# Patient Record
Sex: MALE | Race: WHITE
[De-identification: names, ages, dates, MRNs, and addresses within clinical notes are randomized per-mention and may not be internally consistent; named-entity substitution may affect disease eponyms.]

---

## 2017-12-07 ENCOUNTER — APPOINTMENT (OUTPATIENT)
Dept: UROLOGY | Facility: CLINIC | Age: 62
End: 2017-12-07
Payer: COMMERCIAL

## 2017-12-07 PROCEDURE — 99213 OFFICE O/P EST LOW 20 MIN: CPT

## 2017-12-18 LAB
ANION GAP SERPL CALC-SCNC: 12 MMOL/L
APPEARANCE: CLEAR
BACTERIA: NEGATIVE
BILIRUBIN URINE: NEGATIVE
BLOOD URINE: NEGATIVE
BUN SERPL-MCNC: 17 MG/DL
CALCIUM SERPL-MCNC: 9.5 MG/DL
CHLORIDE SERPL-SCNC: 101 MMOL/L
CO2 SERPL-SCNC: 32 MMOL/L
COLOR: YELLOW
CORE LAB FLUID CYTOLOGY: NORMAL
CREAT SERPL-MCNC: 0.92 MG/DL
GLUCOSE QUALITATIVE U: NEGATIVE MG/DL
GLUCOSE SERPL-MCNC: 94 MG/DL
KETONES URINE: NEGATIVE
LEUKOCYTE ESTERASE URINE: NEGATIVE
MICROSCOPIC-UA: NORMAL
NITRITE URINE: NEGATIVE
PH URINE: 7
POTASSIUM SERPL-SCNC: 5.2 MMOL/L
PROTEIN URINE: NEGATIVE MG/DL
PSA FREE FLD-MCNC: 8.8
PSA FREE SERPL-MCNC: 0.35 NG/ML
PSA SERPL-MCNC: 3.99 NG/ML
RED BLOOD CELLS URINE: 1 /HPF
SODIUM SERPL-SCNC: 145 MMOL/L
SPECIFIC GRAVITY URINE: 1.02
SQUAMOUS EPITHELIAL CELLS: 0 /HPF
UROBILINOGEN URINE: 1 MG/DL
WHITE BLOOD CELLS URINE: 0 /HPF

## 2018-12-13 ENCOUNTER — APPOINTMENT (OUTPATIENT)
Dept: UROLOGY | Facility: CLINIC | Age: 63
End: 2018-12-13
Payer: COMMERCIAL

## 2018-12-13 PROCEDURE — 99214 OFFICE O/P EST MOD 30 MIN: CPT

## 2018-12-17 LAB
ANION GAP SERPL CALC-SCNC: 12 MMOL/L
APPEARANCE: CLEAR
BACTERIA: NEGATIVE
BILIRUBIN URINE: NEGATIVE
BLOOD URINE: NEGATIVE
BUN SERPL-MCNC: 17 MG/DL
CALCIUM SERPL-MCNC: 9.3 MG/DL
CHLORIDE SERPL-SCNC: 106 MMOL/L
CO2 SERPL-SCNC: 28 MMOL/L
COLOR: YELLOW
CREAT SERPL-MCNC: 0.95 MG/DL
GLUCOSE QUALITATIVE U: NEGATIVE MG/DL
GLUCOSE SERPL-MCNC: 81 MG/DL
HYALINE CASTS: 1 /LPF
KETONES URINE: NEGATIVE
LEUKOCYTE ESTERASE URINE: NEGATIVE
MICROSCOPIC-UA: NORMAL
NITRITE URINE: NEGATIVE
PH URINE: 6.5
POTASSIUM SERPL-SCNC: 4.6 MMOL/L
PROTEIN URINE: NEGATIVE MG/DL
PSA FREE FLD-MCNC: 8 %
PSA FREE SERPL-MCNC: 0.35 NG/ML
PSA SERPL-MCNC: 4.36 NG/ML
RED BLOOD CELLS URINE: 4 /HPF
SODIUM SERPL-SCNC: 146 MMOL/L
SPECIFIC GRAVITY URINE: 1.02
SQUAMOUS EPITHELIAL CELLS: 0 /HPF
UROBILINOGEN URINE: 1 MG/DL
WHITE BLOOD CELLS URINE: 1 /HPF

## 2018-12-20 LAB — CORE LAB FLUID CYTOLOGY: NORMAL

## 2019-03-15 LAB
PSA FREE FLD-MCNC: 10 %
PSA FREE SERPL-MCNC: 0.41 NG/ML
PSA SERPL-MCNC: 4.07 NG/ML

## 2019-06-23 ENCOUNTER — TRANSCRIPTION ENCOUNTER (OUTPATIENT)
Age: 64
End: 2019-06-23

## 2019-09-19 DIAGNOSIS — Z00.00 ENCOUNTER FOR GENERAL ADULT MEDICAL EXAMINATION W/OUT ABNORMAL FINDINGS: ICD-10-CM

## 2019-09-19 DIAGNOSIS — M79.606 PAIN IN LEG, UNSPECIFIED: ICD-10-CM

## 2019-09-19 DIAGNOSIS — H26.9 UNSPECIFIED CATARACT: ICD-10-CM

## 2019-09-24 LAB
PSA FREE FLD-MCNC: 9 %
PSA FREE SERPL-MCNC: 0.42 NG/ML
PSA SERPL-MCNC: 4.72 NG/ML

## 2019-10-23 LAB
ANION GAP SERPL CALC-SCNC: 13 MMOL/L
BUN SERPL-MCNC: 17 MG/DL
CALCIUM SERPL-MCNC: 9.4 MG/DL
CHLORIDE SERPL-SCNC: 104 MMOL/L
CO2 SERPL-SCNC: 28 MMOL/L
CREAT SERPL-MCNC: 1.08 MG/DL
GLUCOSE SERPL-MCNC: 94 MG/DL
POTASSIUM SERPL-SCNC: 4.5 MMOL/L
SODIUM SERPL-SCNC: 145 MMOL/L

## 2019-10-31 ENCOUNTER — FORM ENCOUNTER (OUTPATIENT)
Age: 64
End: 2019-10-31

## 2019-11-01 ENCOUNTER — APPOINTMENT (OUTPATIENT)
Dept: MRI IMAGING | Facility: IMAGING CENTER | Age: 64
End: 2019-11-01
Payer: COMMERCIAL

## 2019-11-01 ENCOUNTER — OUTPATIENT (OUTPATIENT)
Dept: OUTPATIENT SERVICES | Facility: HOSPITAL | Age: 64
LOS: 1 days | End: 2019-11-01
Payer: COMMERCIAL

## 2019-11-01 DIAGNOSIS — Z00.00 ENCOUNTER FOR GENERAL ADULT MEDICAL EXAMINATION WITHOUT ABNORMAL FINDINGS: ICD-10-CM

## 2019-11-01 DIAGNOSIS — M79.606 PAIN IN LEG, UNSPECIFIED: ICD-10-CM

## 2019-11-01 DIAGNOSIS — H26.9 UNSPECIFIED CATARACT: ICD-10-CM

## 2019-11-01 DIAGNOSIS — R97.20 ELEVATED PROSTATE SPECIFIC ANTIGEN [PSA]: ICD-10-CM

## 2019-11-01 DIAGNOSIS — R31.29 OTHER MICROSCOPIC HEMATURIA: ICD-10-CM

## 2019-11-01 PROCEDURE — 72197 MRI PELVIS W/O & W/DYE: CPT | Mod: 26

## 2019-11-01 PROCEDURE — A9585: CPT

## 2019-11-01 PROCEDURE — 72197 MRI PELVIS W/O & W/DYE: CPT

## 2019-11-05 ENCOUNTER — RECORD ABSTRACTING (OUTPATIENT)
Age: 64
End: 2019-11-05

## 2020-01-07 ENCOUNTER — RECORD ABSTRACTING (OUTPATIENT)
Age: 65
End: 2020-01-07

## 2020-02-03 ENCOUNTER — APPOINTMENT (OUTPATIENT)
Dept: UROLOGY | Facility: CLINIC | Age: 65
End: 2020-02-03
Payer: COMMERCIAL

## 2020-02-03 PROCEDURE — 99214 OFFICE O/P EST MOD 30 MIN: CPT

## 2020-02-03 NOTE — ADDENDUM
[FreeTextEntry1] : Entered by Ru Almeida, acting as scribe for Dr. Thong Iqbal.\par \par The documentation recorded by the scribe accurately reflects the service I personally performed and the decisions made by me.

## 2020-02-03 NOTE — LETTER BODY
[FreeTextEntry1] : Stevan Love MD\par [address]\[place] [address]\par P [phone]\par F [phone]\par  \par Dear Dr. Love,\par \par Reason for visit: Abnormal urinalysis. Previous elevated PSA.\par \par This is a 64 -year-old  with transverse myelitis, presenting with previous abnormal urinalysis and previous elevated PSA. The patient returns today for follow up, accompanied by his wife. Patient's current PSA is 4.72. Since the patient's last visit, patient underwent positive prostate biopsy at [facility] demonstrating Andre 7 prostate cancer. He inquires his treatment options including radiation therapy and surgical intervention. He shows concern for the risk of erectile dysfunction and urinary incontinence. He has cervical stenosis and wears a neck brace today. Patient denies any hematuria or dysuria. The past medical history and family history and social history are unchanged. All other review of systems are negative. Patient denies any changes in medications. Medication list was reconciled. His transverse myelitis as well as has been stable.\par \par On examination, the patient is a healthy-appearing gentleman in no acute distress. He is alert and oriented follows commands. He has normal mood and affect. He is normocephalic. Oral no thrush. Neck is supple. Respirations are unlabored. His abdomen is soft and nontender. Liver is nonpalpable. Bladder is nonpalpable. No CVA tenderness. Neurologically he is grossly intact. No peripheral edema. Skin without gross abnormality. He has normal male external genitalia. Normal meatus. Bilateral testes are descended intrascrotally and normal to palpation. On rectal examination, there is normal sphincter tone. The prostate is clinically benign without focal induration or nodularity.\par \par His BMP demonstrated normal renal functions, creatinine 1.08. His PSA was 4.72, which is elevated. \par \par Patient's prostate biopsy indicated Andre 7 prostate cancer.\par \par Assessment: Previous abnormal urinalysis. Previous elevated PSA. Prostate cancer.\par \par .I counseled the patient on its clinical significance of his prostate biopsy. I discussed the patient's treatment options of radiation therapy and surgical intervention in length. I counseled the patient on the risks of surgical intervention including erectile dysfunction as well as the risks of radiation therapy including urinary incontinence. The risks and benefits of each options were discussed in detail as well quality of life issues. I discussed that combination of radiation and hormone therapy is only typical of Andre 8 prostate cancer as the efficacy of radiation treatment is masked by hormone therapy. Given his intermediate risk, I advised that HDR is not recommended. I recommend he consider his risk tolerance to make his decision. I answered the patient's questions. I recommend he consult his surgeon and radiation oncologist. Patient may also obtain a second opinion from my colleagues, Dr. Owusu or Dr. Mavis Perry. Risks and alternatives were discussed. I answered the patient's questions. The patient will follow up as directed. Thank you for the opportunity to participate in the care of this patient. I'll keep you updated on his progress. \par \par Plan: Consult surgeon and radiation oncologist. Obtain second opinion. Follow up in one year.

## 2020-02-10 ENCOUNTER — OUTPATIENT (OUTPATIENT)
Dept: OUTPATIENT SERVICES | Facility: HOSPITAL | Age: 65
LOS: 1 days | Discharge: HOME | End: 2020-02-10
Payer: COMMERCIAL

## 2020-02-10 ENCOUNTER — APPOINTMENT (OUTPATIENT)
Dept: RADIATION ONCOLOGY | Facility: CLINIC | Age: 65
End: 2020-02-10
Payer: COMMERCIAL

## 2020-02-10 VITALS
DIASTOLIC BLOOD PRESSURE: 86 MMHG | HEIGHT: 73 IN | RESPIRATION RATE: 17 BRPM | SYSTOLIC BLOOD PRESSURE: 136 MMHG | OXYGEN SATURATION: 98 % | TEMPERATURE: 97.6 F | WEIGHT: 198.85 LBS | BODY MASS INDEX: 26.36 KG/M2 | HEART RATE: 80 BPM

## 2020-02-10 PROCEDURE — 99204 OFFICE O/P NEW MOD 45 MIN: CPT

## 2020-02-10 NOTE — DISEASE MANAGEMENT
[1] : T1 [0-10] : 0 -10 ng/mL [c] : c [7(3+4)] : Andre Score 7(3+4) [4] : 4 [] : Patient had a bone scan [IIB] : IIB [BiopsyDate] : 12/3/19 [TotalCores] : 13 [MaxCoreInvolvement] : 30% [TotalPositiveCores] : 3 [BoneScanResults] : 2/4/20

## 2020-02-10 NOTE — REVIEW OF SYSTEMS
[Nocturia] : nocturia [Urinary Frequency] : urinary frequency [IPSS Score (0-40): ___] : IPSS score: [unfilled] [EPIC-CP Score (0-60): ___] : EPIC-CP score: [unfilled] [Hematuria: Grade 0] : Hematuria: Grade 0 [Urinary Incontinence: Grade 0] : Urinary Incontinence: Grade 0  [Urinary Retention: Grade 0] : Urinary Retention: Grade 0 [Urinary Tract Pain: Grade 0] : Urinary Tract Pain: Grade 0 [Urinary Urgency: Grade 1 - Present] : Urinary Urgency: Grade 1 - Present [Urinary Frequency: Grade 1 - Present] : Urinary Frequency: Grade 1 - Present [Ejaculation Disorder: Grade 0] : Ejaculation Disorder: Grade 0 [Erectile Dysfunction: Grade 0] : Erectile Dysfunction: Grade 0

## 2020-02-10 NOTE — HISTORY OF PRESENT ILLNESS
[FreeTextEntry1] : Mr. King is a 76 year old male who presents with adenocarcinoma of the prostate [place] score  He was referred by Dr. Iqbal who patient consulted for elevated PSA of 4.72 in Sept, 2019\par \par PSA \par 19 Sep, 2019  -4.72\par 14 MaR, 2019 - 4.07\par 13 Dec, 2018  -4.36 \par 07 Dec, 2017 -3.99\par 15 Dec, 2016 -3.38\par 17 Dec, 2015 -3.54\par 18 Dec, 2014 -2.81\par 19 Dec, 2013 -3.17\par 20 Dec, 2012 -3.08\par 11 June, 2012- 7.9 \par  \par He had MRI prostate done on 11/01/2019 which showed prostate volume of 64 cc. Suspicion for malignancy right posterior apex peripheral zone, if biopsy confirms malignancy, there is suspicion for extracapsular extension PIRADS 4. Also noted was mild thickening of right anterior urinary bladder wall on a background of diffuse urothelial hyperenhancement, possibly inflammatory, possibly neoplastic.\par \par Patient underwent prostate biopsy with Dr. Alexia Muñoz on 12/03/2019,  pathology report demonstrated adenocarcinoma of the prostate with [place] score 3 + 4 =7 involving 5 % - 30% of the tissue. Perineural invasion was identified on right apex medial. \par \par Bone scan done on 2/4/20 showed punctate focus of increased uptake in right sided rib, strongly suspected to be artifactual. CT chest recommended. Findings at the cervical spine consistent with recent cervical laminectomy.\par \par He follow up with Dr Iqbal to discuss his treatment options and was referred to us for second opinion.\par \par Patient presents here today to discuss his radiation treatment option. He report nocturia three times per night, urgency and frequency. Denies dysuria, hematuria and hesitancy, and weak urinary flow. He has regular daily bowel function and he is sexually active. He denies bone or groin pain.\par \par \par \par

## 2020-02-20 ENCOUNTER — RESULT REVIEW (OUTPATIENT)
Age: 65
End: 2020-02-20

## 2020-02-20 PROCEDURE — 88321 CONSLTJ&REPRT SLD PREP ELSWR: CPT

## 2020-03-02 LAB — SURGICAL PATHOLOGY STUDY: SIGNIFICANT CHANGE UP

## 2020-06-11 ENCOUNTER — APPOINTMENT (OUTPATIENT)
Dept: RADIATION ONCOLOGY | Facility: CLINIC | Age: 65
End: 2020-06-11
Payer: COMMERCIAL

## 2020-06-11 ENCOUNTER — APPOINTMENT (OUTPATIENT)
Dept: UROLOGY | Facility: CLINIC | Age: 65
End: 2020-06-11

## 2020-06-11 PROCEDURE — 99213 OFFICE O/P EST LOW 20 MIN: CPT | Mod: 95

## 2020-06-11 NOTE — REVIEW OF SYSTEMS
[IPSS Score (0-40): ___] : IPSS score: [unfilled] [Hematuria: Grade 0] : Hematuria: Grade 0 [EPIC-CP Score (0-60): ___] : EPIC-CP score: [unfilled] [Urinary Incontinence: Grade 0] : Urinary Incontinence: Grade 0  [Urinary Retention: Grade 0] : Urinary Retention: Grade 0 [Urinary Urgency: Grade 1 - Present] : Urinary Urgency: Grade 1 - Present [Urinary Tract Pain: Grade 0] : Urinary Tract Pain: Grade 0 [Erectile Dysfunction: Grade 0] : Erectile Dysfunction: Grade 0 [Ejaculation Disorder: Grade 0] : Ejaculation Disorder: Grade 0 [Nocturia] : nocturia [Negative] : Gastrointestinal [Urinary Frequency: Grade 0] : Urinary Frequency: Grade 0 [Urinary Frequency] : no urinary frequency

## 2020-06-11 NOTE — HISTORY OF PRESENT ILLNESS
[Medical Office: ([facility])___] : at the medical office located in  [Home] : at home, [unfilled] , at the time of the visit. [Verbal consent obtained from patient] : the patient, [unfilled] [FreeTextEntry1] : \par Mr. King is a 76 year old male who presents with adenocarcinoma of the prostate [place] score  He was referred by Dr. Iqbal who patient consulted for elevated PSA of 4.72 in Sept, 2019\par \par PSA \par 5/28/20          - 3.99\par 19 Sep, 2019  -4.72\par 14 MaR, 2019 - 4.07\par 13 Dec, 2018  -4.36 \par 07 Dec, 2017 -3.99\par 15 Dec, 2016 -3.38\par 17 Dec, 2015 -3.54\par 18 Dec, 2014 -2.81\par 19 Dec, 2013 -3.17\par 20 Dec, 2012 -3.08\par 11 June, 2012- 7.9 \par  \par He had MRI prostate done on 11/01/2019 which showed prostate volume of 64 cc. Suspicion for malignancy right posterior apex peripheral zone, if biopsy confirms malignancy, there is suspicion for extracapsular extension PIRADS 4. Also noted was mild thickening of right anterior urinary bladder wall on a background of diffuse urothelial hyperenhancement, possibly inflammatory, possibly neoplastic.\par \par Patient underwent prostate biopsy with Dr. Alexia Muñoz on 12/03/2019,  pathology report demonstrated adenocarcinoma of the prostate with Andre score 3 + 4 =7 involving 5 % - 30% of the tissue. Perineural invasion was identified on right apex medial. \par \par Bone scan done on 2/4/20 showed punctate focus of increased uptake in right sided rib, strongly suspected to be artifactual. CT chest recommended. Findings at the cervical spine consistent with recent cervical laminectomy.\par \par He follow up with Dr Iqbal to discuss his treatment options and was referred to us for second opinion.\par \par Patient presents here today for active surveillance. He report 1-2 times nocturia per night and occasional urgency. He denies any other urinary or bowel issues and he is sexually active.\par \par \par \par

## 2020-06-11 NOTE — DISEASE MANAGEMENT
[1] : T1 [c] : c [0-10] : 0 -10 ng/mL [7(3+4)] : Andre Score 7(3+4) [] : Patient had a bone scan [4] : 4 [IIB] : IIB [BiopsyDate] : 12/3/19 [TotalCores] : 13 [TotalPositiveCores] : 3 [MaxCoreInvolvement] : 30% [BoneScanResults] : 2/4/20

## 2020-06-18 ENCOUNTER — APPOINTMENT (OUTPATIENT)
Dept: UROLOGY | Facility: CLINIC | Age: 65
End: 2020-06-18
Payer: COMMERCIAL

## 2020-06-18 PROCEDURE — 99214 OFFICE O/P EST MOD 30 MIN: CPT | Mod: 95

## 2020-06-18 NOTE — HISTORY OF PRESENT ILLNESS
[Home] : at home, [unfilled] , at the time of the visit. [Medical Office: ([facility])___] : at the medical office located in  [Verbal consent obtained from patient] : the patient, [unfilled] [FreeTextEntry1] : Confirmed PT PHONE#:  [phone] \par \par The patient-doctor relationship has been established in a face to face fashion via real time video/audio HIPAA compliant communication using telemedicine software. The patient's identity has been confirmed.  The patient was previously emailed a copy of the telemedicine consent.  They have had a chance to review and has now given verbal consent and has requested care to be assessed and treated through telemedicine and understands there maybe limitations in this process and they may need further follow up care in the office and or hospital settings.   \par \par Please refer to URO Consult note

## 2020-06-18 NOTE — LETTER BODY
[FreeTextEntry1] : Stevan Love MD\par [address]\[place] [address]\par P [phone]\par F [phone]\par  \par Dear Dr. Love,\par \par Reason for visit: Abnormal urinalysis. Prostate cancer.\par \par This is a 64 -year-old  with transverse myelitis, presenting with previous abnormal urinalysis and prostate cancer. Patient underwent positive prostate biopsy, positive for 3 of 13 cores, at [facility] demonstrating [place] 7 prostate cancer. Patient requested telephone visit. Verbal consent was obtained. Patient was at home and consulted over the phone through a TeleHealth visit. I was in the office in [place]. Patient's current PSA is 3.99. Since the patient's last visit, patient reports consulting Dr. Owusu for a second opinion on the treatment of his prostate cancer, who recommends Cyberknife therapy. He inquires about the risks and benefits of radiation therapy versus surgery. He recently underwent cervical laminectomy. Patient denies any hematuria or dysuria. The past medical history and family history and social history are unchanged. All other review of systems are negative. Patient denies any changes in medications. Medication list was reconciled. His transverse myelitis as well as has been stable.\par \par Given that this was a Telehealth visit, I was unable to physically examine the patient, his physical examination was deferred. \par \par His PSA is now 3.99, which has decreased. His previous PSA was 4.72.\par \par Assessment: Previous abnormal urinalysis. Prostate cancer.\par \par I counseled the patient. I discussed the patient's treatment options of radiation therapy and surgical intervention in length. I counseled the patient regarding the risks involved with surgical intervention including impotence and incontinence although it results in better prognosis. I discussed that surgery has the option of salvage radiation therapy if there is recurrent disease. The risks and benefits of each options were discussed in detail as well quality of life issues. Given his Andre 7 prostate cancer, I discussed that Cyberknife and role of XRT. I recommend he consider his risk tolerance to make his decision. He will continue to consider his options. I answered the patient's questions. Risks and alternatives were discussed.  The patient will follow up as directed. Thank you for the opportunity to participate in the care of this patient. I'll keep you updated on his progress. \par \par Plan: Consider options. Follow up as directed.

## 2020-08-04 PROCEDURE — 77263 THER RADIOLOGY TX PLNG CPLX: CPT

## 2020-08-25 ENCOUNTER — OUTPATIENT (OUTPATIENT)
Dept: OUTPATIENT SERVICES | Facility: HOSPITAL | Age: 65
LOS: 1 days | Discharge: HOME | End: 2020-08-25
Payer: COMMERCIAL

## 2020-08-25 PROCEDURE — 55876 PLACE RT DEVICE/MARKER PROS: CPT

## 2020-08-25 PROCEDURE — 76872 US TRANSRECTAL: CPT | Mod: 26

## 2020-08-25 PROCEDURE — 55874 TPRNL PLMT BIODEGRDABL MATRL: CPT

## 2020-08-25 NOTE — PROCEDURE
[FreeTextEntry1] : Transperineal insertion of space OAR Hydrogel [FreeTextEntry3] : In preparation for the procedure, he self-administered an enema one hour before leaving home and was NPO the night before procedure. He was prescribed a 3 days course of oral antibiotics twice daily to be started a day prior to the procedure. Tropical PARVIN cream was applied to the perineal area one hour prior to procedure. Patient was prescribed and took Valium 5mg and Tylenol 650 mg upon arrival in the department one hour to procedure. Procedure risk and benefits were reviewed with patient and a written consent was obtained prior to procedure. A time out was observed with patient name, date of birth, procedure, position, and site verified. \par \par Patient was placed in a lithotomy position. Chloral prep was used to prep the skin. While maintaining aseptic technique, an ultrasound probe was inserted into the rectum to visualize the prostate. Less than 10 cc of Lidocaine 2% and sodium bicarbonate 8.4% was injected subcutaneously. Afterwards, 20 cc of Lidocaine and sodium bicarbonate was injected internally at the prostate apex and bilateral neurovascular bundles for the nerve block.\par  Three fiducial markers were prepared on the sterile field. One fiducial marker was placed into each of the following sites: left lobe base, right lobe base, and apex, via 14 gauge needles under ultrasound guidance. \par  \par \par Next, the hydrogel spacer kit was opened onto the sterile field and the hydrogel injection apparatus was prepared. An 18 gauge needle was positioned into the mid-line perirectal fat between the anterior rectal wall and prostate under ultrasound guidance. Less than 10 cc of saline was injected via the needle to hydrodissect the space and confirm proper placement in both axial and sagittal views. The syringe was aspirated to confirm the needle was extravascular. The syringe was replaced with the hydrogel injection apparatus and the gel was injected over about 10 seconds. The needle was then removed. There was minimal blood loss. The patient tolerated procedure well.\par Patient was transferred to the recovery area on a monitor. Vital signs were stable. He tolerated fluid and a snack by mouth and was made comfortable. He denied pain. Post procedure instruction were given and reviewed with patient. CT/SIM and MRI appointment was given. He was discharged home in a stable condition, accompanied by his wife\par  [FreeTextEntry2] : As part of the treatment planning for prostate cancer treatment

## 2020-09-04 ENCOUNTER — OUTPATIENT (OUTPATIENT)
Dept: OUTPATIENT SERVICES | Facility: HOSPITAL | Age: 65
LOS: 1 days | End: 2020-09-04

## 2020-09-04 DIAGNOSIS — C61 MALIGNANT NEOPLASM OF PROSTATE: ICD-10-CM

## 2020-09-04 DIAGNOSIS — Z00.8 ENCOUNTER FOR OTHER GENERAL EXAMINATION: ICD-10-CM

## 2020-09-17 ENCOUNTER — OUTPATIENT (OUTPATIENT)
Dept: OUTPATIENT SERVICES | Facility: HOSPITAL | Age: 65
LOS: 1 days | End: 2020-09-17
Payer: COMMERCIAL

## 2020-09-17 ENCOUNTER — OUTPATIENT (OUTPATIENT)
Dept: OUTPATIENT SERVICES | Facility: HOSPITAL | Age: 65
LOS: 1 days | End: 2020-09-17

## 2020-09-17 ENCOUNTER — APPOINTMENT (OUTPATIENT)
Dept: MRI IMAGING | Facility: IMAGING CENTER | Age: 65
End: 2020-09-17

## 2020-09-17 DIAGNOSIS — C61 MALIGNANT NEOPLASM OF PROSTATE: ICD-10-CM

## 2020-09-17 DIAGNOSIS — Z00.8 ENCOUNTER FOR OTHER GENERAL EXAMINATION: ICD-10-CM

## 2020-09-17 PROCEDURE — 77290 THER RAD SIMULAJ FIELD CPLX: CPT | Mod: 26

## 2020-09-17 PROCEDURE — 76498 UNLISTED MR PROCEDURE: CPT

## 2020-09-17 RX ORDER — MULTIVITAMIN
TABLET ORAL
Refills: 0 | Status: ACTIVE | COMMUNITY

## 2020-09-17 RX ORDER — CHROMIUM 200 MCG
TABLET ORAL
Refills: 0 | Status: ACTIVE | COMMUNITY

## 2020-09-17 RX ORDER — CIPROFLOXACIN HYDROCHLORIDE 500 MG/1
500 TABLET, FILM COATED ORAL
Qty: 10 | Refills: 0 | Status: DISCONTINUED | COMMUNITY
Start: 2020-08-04 | End: 2020-09-17

## 2020-09-17 RX ORDER — IBUPROFEN 800 MG/1
TABLET, FILM COATED ORAL
Refills: 0 | Status: ACTIVE | COMMUNITY

## 2020-09-23 PROCEDURE — 77295 3-D RADIOTHERAPY PLAN: CPT | Mod: 26

## 2020-09-23 PROCEDURE — 77334 RADIATION TREATMENT AID(S): CPT | Mod: 26

## 2020-09-23 PROCEDURE — 77300 RADIATION THERAPY DOSE PLAN: CPT | Mod: 26

## 2020-10-03 ENCOUNTER — EMERGENCY (EMERGENCY)
Facility: HOSPITAL | Age: 65
LOS: 1 days | Discharge: HOME | End: 2020-10-03
Attending: EMERGENCY MEDICINE | Admitting: EMERGENCY MEDICINE
Payer: COMMERCIAL

## 2020-10-03 VITALS
RESPIRATION RATE: 18 BRPM | DIASTOLIC BLOOD PRESSURE: 91 MMHG | TEMPERATURE: 98 F | SYSTOLIC BLOOD PRESSURE: 157 MMHG | HEART RATE: 97 BPM | OXYGEN SATURATION: 100 %

## 2020-10-03 LAB
ANION GAP SERPL CALC-SCNC: 15 MMO/L — HIGH (ref 7–14)
APPEARANCE UR: CLEAR — SIGNIFICANT CHANGE UP
BILIRUB UR-MCNC: NEGATIVE — SIGNIFICANT CHANGE UP
BLOOD UR QL VISUAL: NEGATIVE — SIGNIFICANT CHANGE UP
BUN SERPL-MCNC: 18 MG/DL — SIGNIFICANT CHANGE UP (ref 7–23)
CALCIUM SERPL-MCNC: 9.2 MG/DL — SIGNIFICANT CHANGE UP (ref 8.4–10.5)
CHLORIDE SERPL-SCNC: 98 MMOL/L — SIGNIFICANT CHANGE UP (ref 98–107)
CO2 SERPL-SCNC: 23 MMOL/L — SIGNIFICANT CHANGE UP (ref 22–31)
COLOR SPEC: YELLOW — SIGNIFICANT CHANGE UP
CREAT SERPL-MCNC: 1.11 MG/DL — SIGNIFICANT CHANGE UP (ref 0.5–1.3)
GLUCOSE SERPL-MCNC: 119 MG/DL — HIGH (ref 70–99)
GLUCOSE UR-MCNC: NEGATIVE — SIGNIFICANT CHANGE UP
HCT VFR BLD CALC: 41.7 % — SIGNIFICANT CHANGE UP (ref 39–50)
HGB BLD-MCNC: 13.8 G/DL — SIGNIFICANT CHANGE UP (ref 13–17)
KETONES UR-MCNC: NEGATIVE — SIGNIFICANT CHANGE UP
LEUKOCYTE ESTERASE UR-ACNC: NEGATIVE — SIGNIFICANT CHANGE UP
MCHC RBC-ENTMCNC: 29.7 PG — SIGNIFICANT CHANGE UP (ref 27–34)
MCHC RBC-ENTMCNC: 33.1 % — SIGNIFICANT CHANGE UP (ref 32–36)
MCV RBC AUTO: 89.7 FL — SIGNIFICANT CHANGE UP (ref 80–100)
NITRITE UR-MCNC: NEGATIVE — SIGNIFICANT CHANGE UP
NRBC # FLD: 0 K/UL — SIGNIFICANT CHANGE UP (ref 0–0)
PH UR: 6 — SIGNIFICANT CHANGE UP (ref 5–8)
PLATELET # BLD AUTO: 114 K/UL — LOW (ref 150–400)
PMV BLD: 11 FL — SIGNIFICANT CHANGE UP (ref 7–13)
POTASSIUM SERPL-MCNC: 4 MMOL/L — SIGNIFICANT CHANGE UP (ref 3.5–5.3)
POTASSIUM SERPL-SCNC: 4 MMOL/L — SIGNIFICANT CHANGE UP (ref 3.5–5.3)
PROT UR-MCNC: NEGATIVE — SIGNIFICANT CHANGE UP
RBC # BLD: 4.65 M/UL — SIGNIFICANT CHANGE UP (ref 4.2–5.8)
RBC # FLD: 12.9 % — SIGNIFICANT CHANGE UP (ref 10.3–14.5)
SODIUM SERPL-SCNC: 136 MMOL/L — SIGNIFICANT CHANGE UP (ref 135–145)
SP GR SPEC: 1.01 — SIGNIFICANT CHANGE UP (ref 1–1.04)
UROBILINOGEN FLD QL: NORMAL — SIGNIFICANT CHANGE UP
WBC # BLD: 6.83 K/UL — SIGNIFICANT CHANGE UP (ref 3.8–10.5)
WBC # FLD AUTO: 6.83 K/UL — SIGNIFICANT CHANGE UP (ref 3.8–10.5)

## 2020-10-03 PROCEDURE — 99283 EMERGENCY DEPT VISIT LOW MDM: CPT

## 2020-10-03 NOTE — ED PROVIDER NOTE - CLINICAL SUMMARY MEDICAL DECISION MAKING FREE TEXT BOX
45M presenting to the ED 1 day after receiving radiation treatment to the prostate for cancer with 11 hours of urinary retention and abdominal pain. Kim catheter in place, draining yellow urine. Physical exam reveals no cva tenderness, nontender belly, comfortable appearance. Differential includes obstruction, uti. Plan: continue kim, meds as necessary, labs to assess kidney function, monitoring for r/o POD.

## 2020-10-03 NOTE — ED PROVIDER NOTE - NSFOLLOWUPINSTRUCTIONS_ED_ALL_ED_FT
You were treated in the Ed for urinary retention. We placed a kim catheter and you were able to drain your bladder. Since you felt better after we placed the kim, you are able to leave safely. Please follow up with urology within 72 hours in order to discuss management of the kim. Please return to the Ed if your catheter stops working or you develop severe pain.

## 2020-10-03 NOTE — ED PROVIDER NOTE - PHYSICAL EXAMINATION
PHYSICAL EXAM: performed after kim placement  GENERAL: NAD, well-developed, kim catheter in place, draining yellow urine  HEAD:  Atraumatic, Normocephalic  EYES: EOMI, PERRLA, conjunctiva and sclera clear  NECK: Supple, No JVD  CHEST/LUNG: Clear to auscultation bilaterally; No wheeze  HEART: Regular rate and rhythm; No murmurs, rubs, or gallops  ABDOMEN: Soft, Nontender; Bowel sounds present; no CVA tenderness  EXTREMITIES:  2+ Peripheral Pulses, No clubbing, cyanosis, or edema  PSYCH: AAOx3  NEUROLOGY: non-focal  SKIN: small angiomas over body, old scar behind neck

## 2020-10-03 NOTE — ED PROVIDER NOTE - OBJECTIVE STATEMENT
65M receiving radiation therapy for his prostate cancer (score 7) presenting to the ED 1 day after his second dose of radiation with 11 hours of urinary retention and pain. Patient began radiation treatment earlier in the week and reported developing urge incontinence that changed into inability to urinate around 1am on 10/3. He had just began taking flomax yesterday. Post kim catheter placement, patient feels much better; denies current pain, nausea, vomiting.

## 2020-10-03 NOTE — ED PROVIDER NOTE - PATIENT PORTAL LINK FT
You can access the FollowMyHealth Patient Portal offered by [facility] by registering at the following website: [email or web address]. By joining [employer]’s FollowMyHealth portal, you will also be able to view your health information using other applications (apps) compatible with our system.

## 2020-10-03 NOTE — ED PROVIDER NOTE - ATTENDING CONTRIBUTION TO CARE
MD Mcmanus:  patient seen and evaluated with the resident.  I was present for key portions of the History & Physical, and I agree with the Impression & Plan.  MD Mcmanus:  64 yo M, c/o lower abd pain and no urine output since last night.  MHx of prostate CA w/recent radiation.  Associated Sx:  no f/c, no n/v/d.  Context:  primary Urologist = Dr. Iqbal.  VS: wnl.  Physical Exam: adult M, uncomfortable-appearing, NCAT, PERRL, EOMI, neck supple, RRR, CTA B, Abd: s/suprapubic distension.  Impression:  post-prostate radiation urinary obstruction, now decompressed with kim cath.    Plan:  creatinine, UA, Ucx, f/u Dr. Iqbal on Monday.

## 2020-10-03 NOTE — ED CLERICAL - NS ED CLERK NOTE PRE-ARRIVAL INFORMATION; ADDITIONAL PRE-ARRIVAL INFORMATION
Prostate Ca receiving RT- 2nd treatment of 5 this past Friday.  Increasing pelvic, pain, incontinence- started Flomax and Motrin. Unable to urinate X 10 hrs with increased pain.  Hx spinal surgery. RT oncologist Dr Owusu.  Urologist Dr Thong Hooker

## 2020-10-03 NOTE — ED ADULT NURSE NOTE - OBJECTIVE STATEMENT
PT ARRIVES IN SPOT 29-PT C/O URINARY RETENTION. Pt receiving radiation treatments for prostate ca. Pt has been unable to void since last pm. Pt very uncomfortable with a 10/10 pain scale. Pt had a number 16 crede catheter p;laced which drained 1000cc urine. Pts pain decreased--pt now 0/10. Pt had labs drawn and sent

## 2020-10-04 LAB
CULTURE RESULTS: NO GROWTH — SIGNIFICANT CHANGE UP
SPECIMEN SOURCE: SIGNIFICANT CHANGE UP

## 2020-10-12 NOTE — REVIEW OF SYSTEMS
[Constipation: Grade 0] : Constipation: Grade 0 [Diarrhea: Grade 0] : Diarrhea: Grade 0 [Fatigue: Grade 0] : Fatigue: Grade 0 [Hematuria: Grade 0] : Hematuria: Grade 0 [Urinary Retention: Grade 2 - Placement of urinary, suprapubic or intermittent catheter placement indicated; medication indicated] : Urinary Retention: Grade 2 - Placement of urinary, suprapubic or intermittent catheter placement indicated; medication indicated [Urinary Tract Pain: Grade 0] : Urinary Tract Pain: Grade 0

## 2020-10-16 PROCEDURE — 77435 SBRT MANAGEMENT: CPT

## 2020-10-19 PROBLEM — C61 MALIGNANT NEOPLASM OF PROSTATE: Chronic | Status: ACTIVE | Noted: 2020-10-03

## 2020-11-02 ENCOUNTER — INPATIENT (INPATIENT)
Facility: HOSPITAL | Age: 65
LOS: 2 days | Discharge: HOME HEALTH | End: 2020-11-05
Attending: STUDENT IN AN ORGANIZED HEALTH CARE EDUCATION/TRAINING PROGRAM | Admitting: STUDENT IN AN ORGANIZED HEALTH CARE EDUCATION/TRAINING PROGRAM
Payer: COMMERCIAL

## 2020-11-02 VITALS
SYSTOLIC BLOOD PRESSURE: 132 MMHG | OXYGEN SATURATION: 98 % | DIASTOLIC BLOOD PRESSURE: 80 MMHG | TEMPERATURE: 97 F | HEART RATE: 116 BPM | RESPIRATION RATE: 16 BRPM

## 2020-11-02 DIAGNOSIS — N13.9 OBSTRUCTIVE AND REFLUX UROPATHY, UNSPECIFIED: ICD-10-CM

## 2020-11-02 LAB
ALBUMIN SERPL ELPH-MCNC: 3.7 G/DL — SIGNIFICANT CHANGE UP (ref 3.3–5)
ALP SERPL-CCNC: 40 U/L — SIGNIFICANT CHANGE UP (ref 40–120)
ALT FLD-CCNC: 21 U/L — SIGNIFICANT CHANGE UP (ref 4–41)
ANION GAP SERPL CALC-SCNC: 15 MMO/L — HIGH (ref 7–14)
ANION GAP SERPL CALC-SCNC: 15 MMO/L — HIGH (ref 7–14)
APPEARANCE UR: CLEAR — SIGNIFICANT CHANGE UP
AST SERPL-CCNC: 14 U/L — SIGNIFICANT CHANGE UP (ref 4–40)
BACTERIA # UR AUTO: NEGATIVE — SIGNIFICANT CHANGE UP
BASOPHILS # BLD AUTO: 0.01 K/UL — SIGNIFICANT CHANGE UP (ref 0–0.2)
BASOPHILS NFR BLD AUTO: 0.2 % — SIGNIFICANT CHANGE UP (ref 0–2)
BILIRUB SERPL-MCNC: 0.2 MG/DL — SIGNIFICANT CHANGE UP (ref 0.2–1.2)
BILIRUB UR-MCNC: NEGATIVE — SIGNIFICANT CHANGE UP
BLOOD UR QL VISUAL: HIGH
BUN SERPL-MCNC: 125 MG/DL — HIGH (ref 7–23)
BUN SERPL-MCNC: 136 MG/DL — HIGH (ref 7–23)
CALCIUM SERPL-MCNC: 9.3 MG/DL — SIGNIFICANT CHANGE UP (ref 8.4–10.5)
CALCIUM SERPL-MCNC: 9.3 MG/DL — SIGNIFICANT CHANGE UP (ref 8.4–10.5)
CHLORIDE SERPL-SCNC: 106 MMOL/L — SIGNIFICANT CHANGE UP (ref 98–107)
CHLORIDE SERPL-SCNC: 109 MMOL/L — HIGH (ref 98–107)
CO2 SERPL-SCNC: 16 MMOL/L — LOW (ref 22–31)
CO2 SERPL-SCNC: 17 MMOL/L — LOW (ref 22–31)
COLOR SPEC: SIGNIFICANT CHANGE UP
CREAT SERPL-MCNC: 6.32 MG/DL — HIGH (ref 0.5–1.3)
CREAT SERPL-MCNC: 7.56 MG/DL — HIGH (ref 0.5–1.3)
EOSINOPHIL # BLD AUTO: 0.14 K/UL — SIGNIFICANT CHANGE UP (ref 0–0.5)
EOSINOPHIL NFR BLD AUTO: 3 % — SIGNIFICANT CHANGE UP (ref 0–6)
GLUCOSE SERPL-MCNC: 107 MG/DL — HIGH (ref 70–99)
GLUCOSE SERPL-MCNC: 111 MG/DL — HIGH (ref 70–99)
GLUCOSE UR-MCNC: NEGATIVE — SIGNIFICANT CHANGE UP
HCT VFR BLD CALC: 37 % — LOW (ref 39–50)
HGB BLD-MCNC: 12.1 G/DL — LOW (ref 13–17)
HYALINE CASTS # UR AUTO: NEGATIVE — SIGNIFICANT CHANGE UP
IMM GRANULOCYTES NFR BLD AUTO: 0.2 % — SIGNIFICANT CHANGE UP (ref 0–1.5)
KETONES UR-MCNC: NEGATIVE — SIGNIFICANT CHANGE UP
LEUKOCYTE ESTERASE UR-ACNC: NEGATIVE — SIGNIFICANT CHANGE UP
LYMPHOCYTES # BLD AUTO: 0.24 K/UL — LOW (ref 1–3.3)
LYMPHOCYTES # BLD AUTO: 5.2 % — LOW (ref 13–44)
MAGNESIUM SERPL-MCNC: 2.9 MG/DL — HIGH (ref 1.6–2.6)
MCHC RBC-ENTMCNC: 29.7 PG — SIGNIFICANT CHANGE UP (ref 27–34)
MCHC RBC-ENTMCNC: 32.7 % — SIGNIFICANT CHANGE UP (ref 32–36)
MCV RBC AUTO: 90.7 FL — SIGNIFICANT CHANGE UP (ref 80–100)
MONOCYTES # BLD AUTO: 0.38 K/UL — SIGNIFICANT CHANGE UP (ref 0–0.9)
MONOCYTES NFR BLD AUTO: 8.2 % — SIGNIFICANT CHANGE UP (ref 2–14)
NEUTROPHILS # BLD AUTO: 3.83 K/UL — SIGNIFICANT CHANGE UP (ref 1.8–7.4)
NEUTROPHILS NFR BLD AUTO: 83.2 % — HIGH (ref 43–77)
NITRITE UR-MCNC: NEGATIVE — SIGNIFICANT CHANGE UP
NRBC # FLD: 0 K/UL — SIGNIFICANT CHANGE UP (ref 0–0)
NT-PROBNP SERPL-SCNC: 66.13 PG/ML — SIGNIFICANT CHANGE UP
PH UR: 6 — SIGNIFICANT CHANGE UP (ref 5–8)
PHOSPHATE SERPL-MCNC: 5.6 MG/DL — HIGH (ref 2.5–4.5)
PLATELET # BLD AUTO: 81 K/UL — LOW (ref 150–400)
PMV BLD: 10 FL — SIGNIFICANT CHANGE UP (ref 7–13)
POTASSIUM SERPL-MCNC: 6.8 MMOL/L — CRITICAL HIGH (ref 3.5–5.3)
POTASSIUM SERPL-MCNC: 7.9 MMOL/L — CRITICAL HIGH (ref 3.5–5.3)
POTASSIUM SERPL-SCNC: 6.8 MMOL/L — CRITICAL HIGH (ref 3.5–5.3)
POTASSIUM SERPL-SCNC: 7.9 MMOL/L — CRITICAL HIGH (ref 3.5–5.3)
PROT SERPL-MCNC: 6.9 G/DL — SIGNIFICANT CHANGE UP (ref 6–8.3)
PROT UR-MCNC: NEGATIVE — SIGNIFICANT CHANGE UP
RBC # BLD: 4.08 M/UL — LOW (ref 4.2–5.8)
RBC # FLD: 13.8 % — SIGNIFICANT CHANGE UP (ref 10.3–14.5)
RBC CASTS # UR COMP ASSIST: HIGH (ref 0–?)
SODIUM SERPL-SCNC: 138 MMOL/L — SIGNIFICANT CHANGE UP (ref 135–145)
SODIUM SERPL-SCNC: 140 MMOL/L — SIGNIFICANT CHANGE UP (ref 135–145)
SP GR SPEC: 1.01 — SIGNIFICANT CHANGE UP (ref 1–1.04)
SQUAMOUS # UR AUTO: SIGNIFICANT CHANGE UP
UROBILINOGEN FLD QL: NORMAL — SIGNIFICANT CHANGE UP
WBC # BLD: 4.61 K/UL — SIGNIFICANT CHANGE UP (ref 3.8–10.5)
WBC # FLD AUTO: 4.61 K/UL — SIGNIFICANT CHANGE UP (ref 3.8–10.5)
WBC UR QL: SIGNIFICANT CHANGE UP (ref 0–?)

## 2020-11-02 PROCEDURE — 93970 EXTREMITY STUDY: CPT | Mod: 26

## 2020-11-02 PROCEDURE — 71045 X-RAY EXAM CHEST 1 VIEW: CPT | Mod: 26

## 2020-11-02 PROCEDURE — 99285 EMERGENCY DEPT VISIT HI MDM: CPT

## 2020-11-02 PROCEDURE — 99233 SBSQ HOSP IP/OBS HIGH 50: CPT

## 2020-11-02 RX ORDER — DEXTROSE 50 % IN WATER 50 %
50 SYRINGE (ML) INTRAVENOUS ONCE
Refills: 0 | Status: COMPLETED | OUTPATIENT
Start: 2020-11-02 | End: 2020-11-02

## 2020-11-02 RX ORDER — INSULIN HUMAN 100 [IU]/ML
5 INJECTION, SOLUTION SUBCUTANEOUS ONCE
Refills: 0 | Status: COMPLETED | OUTPATIENT
Start: 2020-11-02 | End: 2020-11-02

## 2020-11-02 RX ORDER — CALCIUM GLUCONATE 100 MG/ML
1 VIAL (ML) INTRAVENOUS ONCE
Refills: 0 | Status: COMPLETED | OUTPATIENT
Start: 2020-11-02 | End: 2020-11-02

## 2020-11-02 RX ORDER — SODIUM ZIRCONIUM CYCLOSILICATE 10 G/10G
10 POWDER, FOR SUSPENSION ORAL ONCE
Refills: 0 | Status: COMPLETED | OUTPATIENT
Start: 2020-11-02 | End: 2020-11-02

## 2020-11-02 RX ORDER — LISINOPRIL 2.5 MG/1
10 TABLET ORAL ONCE
Refills: 0 | Status: COMPLETED | OUTPATIENT
Start: 2020-11-02 | End: 2020-11-02

## 2020-11-02 RX ORDER — SODIUM CHLORIDE 9 MG/ML
1000 INJECTION INTRAMUSCULAR; INTRAVENOUS; SUBCUTANEOUS
Refills: 0 | Status: DISCONTINUED | OUTPATIENT
Start: 2020-11-02 | End: 2020-11-03

## 2020-11-02 RX ORDER — SODIUM CHLORIDE 9 MG/ML
500 INJECTION INTRAMUSCULAR; INTRAVENOUS; SUBCUTANEOUS ONCE
Refills: 0 | Status: COMPLETED | OUTPATIENT
Start: 2020-11-02 | End: 2020-11-02

## 2020-11-02 RX ADMIN — SODIUM CHLORIDE 100 MILLILITER(S): 9 INJECTION INTRAMUSCULAR; INTRAVENOUS; SUBCUTANEOUS at 23:45

## 2020-11-02 RX ADMIN — Medication 50 MILLILITER(S): at 23:44

## 2020-11-02 RX ADMIN — SODIUM ZIRCONIUM CYCLOSILICATE 10 GRAM(S): 10 POWDER, FOR SUSPENSION ORAL at 22:26

## 2020-11-02 RX ADMIN — LISINOPRIL 10 MILLIGRAM(S): 2.5 TABLET ORAL at 23:01

## 2020-11-02 RX ADMIN — Medication 100 GRAM(S): at 22:26

## 2020-11-02 RX ADMIN — Medication 1 GRAM(S): at 23:10

## 2020-11-02 RX ADMIN — SODIUM CHLORIDE 500 MILLILITER(S): 9 INJECTION INTRAMUSCULAR; INTRAVENOUS; SUBCUTANEOUS at 23:20

## 2020-11-02 RX ADMIN — INSULIN HUMAN 5 UNIT(S): 100 INJECTION, SOLUTION SUBCUTANEOUS at 23:44

## 2020-11-02 NOTE — ED PROVIDER NOTE - OBJECTIVE STATEMENT
66yo M hx of HTN, depression, prostate cancer s/p radiation therapy on 10/26 here w/ cc of BL LE swelling    States for past few days has had worsening abd and BL LE swelling, has not had this happen before. Did have kim removed recently, was placed for retention. No hx of cardiac issues, no hx of blood clots. No new weakness of LE recently.

## 2020-11-02 NOTE — ED PROVIDER NOTE - PHYSICAL EXAMINATION
General:  NAD  HEENT: pupils equal and reactive, normal external ears bilaterally   Cardiac: RRR, no MRG appreciated  Resp: lungs clear to auscultation bilaterally, symmetric chest wall rise  Abd: soft, suprapubic area TTP, Distended abd  : no CVA tenderness  Neuro: Moving all extremities  Skin:  normal color for race  Lower ext: 2+ pitting edema to LE BL

## 2020-11-02 NOTE — ED PROVIDER NOTE - CARE PLAN
Principal Discharge DX:	Urinary obstruction  Secondary Diagnosis:	Renal failure   Principal Discharge DX:	Urinary obstruction  Secondary Diagnosis:	Renal failure  Secondary Diagnosis:	Leg swelling

## 2020-11-02 NOTE — ED ADULT TRIAGE NOTE - CHIEF COMPLAINT QUOTE
Pt. sent from MD for eval of LE edema and abdominal distention x 3 days. Also c/o RLQ pain. Denies sob, n/v/d or fevers. States he received radiation from prostate ca on 10/26.

## 2020-11-02 NOTE — ED ADULT NURSE NOTE - INTERVENTIONS DEFINITIONS
Physically safe environment: no spills, clutter or unnecessary equipment/[place] to call system/Non-slip footwear when patient is off stretcher/Call bell, personal items and telephone within reach/Instruct patient to call for assistance/Monitor gait and stability

## 2020-11-02 NOTE — ED PROVIDER NOTE - NS ED ROS FT
CONSTITUTIONAL: No fevers, no chills  Eyes: No vision changes  Cardiovascular: No Chest pain  Respiratory: No SOB  Gastrointestinal: No n/v/d, +abd pain and distension   Genitourinary: no dysuria, no hematuria  SKIN: no rashes.  NEURO: negative   PSYCHIATRIC: +depression  Endocrine: No unexplained weight loss

## 2020-11-02 NOTE — ED ADULT TRIAGE NOTE - BANDS:
Skin Lesions: Care Instructions  Your Care Instructions  A skin lesion is a general term used for the different types of bumps, spots, moles or other growths that may appear on your skin. Most skin lesions are harmless, but sometimes they can be a sign of skin cancer or other health problems. Depending on what type of lesion you have, your doctor may cut out all or a small area of the skin tissue and send it to a lab to be looked at under a microscope. This is called a biopsy. A biopsy may be done to figure out what the lesion is or to make sure it is not skin cancer. Follow-up care is a key part of your treatment and safety. Be sure to make and go to all appointments, and call your doctor if you are having problems. It's also a good idea to know your test results and keep a list of the medicines you take. How can you care for yourself at home? · If your doctor told you how to care for your wound, follow your doctor's instructions. If you did not get instructions, follow this general advice:  ¨ Keep the wound bandaged and dry for the first day. ¨ After the first day, wash around the wound with clean water 2 times a day. Don't use hydrogen peroxide or alcohol, which can slow healing. ¨ You may cover the wound with a thin layer of petroleum jelly, such as Vaseline, and a nonstick bandage. ¨ Apply more petroleum jelly and replace the bandage as needed. · If you have stitches, you may get other instructions. You will have to return to have the stitches removed. · If a scab forms, do not pull it off. Let it fall off on its own. Wounds heal faster if no scab forms. Washing the area every day and using petroleum jelly will help keep a scab from forming. · If the wound bleeds, put direct pressure on it with a clean cloth until the bleeding stops. · Take an over-the-counter pain medicine, such as acetaminophen (Tylenol), ibuprofen (Advil, Motrin), or naproxen (Aleve).  Read and follow all instructions on the label.  · Do not take two or more pain medicines at the same time unless the doctor told you to. Many pain medicines have acetaminophen, which is Tylenol. Too much acetaminophen (Tylenol) can be harmful. · If you had a growth \"frozen\" off with liquid nitrogen, you may get a blister. Do not break it. Let it dry up on its own. It is common for the blister to fill with blood. You do not need to do anything about this, but if it becomes too painful, call your doctor. When should you call for help? Call your doctor now or seek immediate medical care if:  ? · You have signs of infection, such as:  ¨ Increased pain, swelling, warmth, or redness. ¨ Red streaks leading from the wound. ¨ Pus draining from the wound. ¨ A fever. ? Watch closely for changes in your health, and be sure to contact your doctor if:  ? · The wound changes, bleeds, or gets worse. ? · You do not get better after 2 weeks of home care. Where can you learn more? Go to http://marcus-carmelo.info/. Enter Q776 in the search box to learn more about \"Skin Lesions: Care Instructions. \"  Current as of: October 13, 2016  Content Version: 11.4  © 4685-4439 [employer]. Care instructions adapted under license by [employer] (which disclaims liability or warranty for this information). If you have questions about a medical condition or this instruction, always ask your healthcare professional. [place] [address] any warranty or liability for your use of this information. Allergy;

## 2020-11-02 NOTE — ED ADULT NURSE REASSESSMENT NOTE - NS ED NURSE REASSESS COMMENT FT1
lab call with critical result of potassium 6.8 on BMP not hemolyzed. Pt noted to remain hypertensive and sinus tachycardic on monitor MD Pineda aware pt medicated as per orders will continue to monitor lab call with critical result of potassium 6.8 on BMP not hemolyzed. Pt noted to remain hypertensive and sinus tachycardic on monitor kim bag drain 1 L urine MD Pineda aware pt medicated as per orders will continue to monitor

## 2020-11-02 NOTE — ED PROVIDER NOTE - CLINICAL SUMMARY MEDICAL DECISION MAKING FREE TEXT BOX
Merlin PGY-3:  66yo M w/ pmhx as described in HPI here w/ cc of BL LE swelling, unclear etiology, no signs or sx of anasarca.  Will obtain US BL LE to assess for DVT, obtain bloodwork including BNP/Cr to assess for metabolic etiology and reassess. Also found to have >1L of urine on bedside bladder scan, will place kim to address this.

## 2020-11-02 NOTE — ED PROVIDER NOTE - PROGRESS NOTE DETAILS
Merlin PGY-3:  Signed out to Edwin PGY3:  If workup with no significant findings and pt abd pain resolves, can consider d/c, however pt needs to be assessed to see if he can closely follow up for further testing and management of LE swelling. If unable to F/U or any abnormalities found in workup may need admission for further workup. Nader Pineda, PGY 3: Received sign out on patient. pending US and reassessment Nader Pineda, PGY 3: pocus showed left pleural effusion, heading to US for LE edema. stable Nader Pineda, PGY 3: Creatinine is severely elevated compare to prior concerning for new renal failure 2/2 to post renal obstruction. K is mildly hemolyzed, no peak t waves on ekg, stable patient, will get rpt labs, and admit to hospitalist. Nader Pineda, PGY 3: spoke with the hospitalist who recom nephro consult. spoke with nephro and recom hold lisinopril. will give insulin and d50, rpt bmp and rpt FS.

## 2020-11-02 NOTE — ED ADULT NURSE NOTE - ED STAT RN HANDOFF DETAILS
Report given to OZZY Gilman. pt a&ox4 and ambulatory with a cane. pt aware of plan of care and awaiting transport at this time. Vital signs as noted, call bell in reach will continue to monitor till transport.

## 2020-11-02 NOTE — ED ADULT NURSE NOTE - OBJECTIVE STATEMENT
Pt arrived to room 13 A&Ox4 ambulatory with a cane at baseline c/o abdominal distention and BLE edema x3 days. PMHx Prostate Ca on radiation- not on chemo therapy, HTN. Abdomen rounded and distended. Bladder distention noted. BLE presenting with +4 weeping edema, Pedal pulses +2. RR even and unlabored, pallor/diaphoresis not noted. Pt NSR on cardiac monitor. Pt denies CP, SOB, HA, cough, fever. IV established with 20G in L forearm. Labs drawn and sent. VSS and as noted. MD at bedside, will continue to monitor.

## 2020-11-02 NOTE — CONSULT NOTE ADULT - ASSESSMENT
Full note to follow   Patient seen and examined. Discussed with attending. Patient not a candidate for the MICU at this time 66yo M hx of HTN, depression, prostate cancer s/p radiation therapy on 10/26 presenting with gradually worsening bilateral   lower extremity edema swelling. Found to have hyperkalemia, DAYSI in the setting of urinary retention.     Hyperkalemia/DAYSI   s/p calcium gluconate and lokelma with improvement in K, continue to monitor and treat as needed  DAYSI likely secondary to urinary retention. Now with kim and significant urine output, continue to monitor Cr  Avoid nephrotoxic agents including home Lisinopril   Treat BP not nephro safe agents  Consider nephri consult     Patient seen and examined. Discussed with attending. Patient not a candidate for the MICU at this time

## 2020-11-02 NOTE — ED PROCEDURE NOTE - ATTENDING CONTRIBUTION TO CARE
MD PULIDO:  I was present for the critical portions of this procedure and provided direct attending supervision.

## 2020-11-02 NOTE — ED ADULT NURSE REASSESSMENT NOTE - NS ED NURSE REASSESS COMMENT FT1
received report from OZZY nixon in no apparent distress. Rpt BMP and COVID swab sent. Pt noted to be hypertensive asymptomatic states did not take daily lisinopril MD New aware no new orders at this time, pt to be admitted pending rpt BM result will continue to monitor received report from OZZY nixon in no apparent distress. Rpt BMP and COVID swab sent. Pt noted to be hypertensive asymptomatic states did not take daily lisinopril MD New aware no new orders at this time, pt to be admitted pending rpt BM result, kim bag emptied 2L will continue to monitor

## 2020-11-02 NOTE — CONSULT NOTE ADULT - SUBJECTIVE AND OBJECTIVE BOX
CHIEF COMPLAINT: Leg swelling    HPI:  64yo M hx of HTN, depression, prostate cancer s/p radiation therapy on 10/26 presenting with gradually worsening bilateral  lower extremity edema swelling. Patient reported that he has noted increased swelling for the past 3-4 days. He has never had  similar issues in the past. In the ED he was found to have DAYSI and hyperkalemia and was noted to have significant urinary  retention. BP also elevated. Reported that he is still making urine. No other issues or complaints.         PAST MEDICAL & SURGICAL HISTORY:  Prostate cancer        FAMILY HISTORY:      SOCIAL HISTORY:  Smoking: [ ] Never Smoked [ ] Former Smoker (__ packs x ___ years) [ ] Current Smoker  (__ packs x ___ years)  Substance Use: [ ] Never Used [ ] Used ____  EtOH Use:  Marital Status: [ ] Single [ ]  [ ]  [ ]   Sexual History:   Occupation:  Recent Travel:  Country of Birth:  Advance Directives:    Allergies    No Known Allergies    Intolerances        HOME MEDICATIONS:    REVIEW OF SYSTEMS:  Constitutional: [ ] negative [ ] fevers [ ] chills [ ] weight loss [ ] weight gain  HEENT: [ ] negative [ ] dry eyes [ ] eye irritation [ ] postnasal drip [ ] nasal congestion  CV: [ ] negative  [ ] chest pain [ ] orthopnea [ ] palpitations [ ] murmur  Resp: [ ] negative [ ] cough [ ] shortness of breath [ ] dyspnea [ ] wheezing [ ] sputum [ ] hemoptysis  GI: [ ] negative [ ] nausea [ ] vomiting [ ] diarrhea [ ] constipation [ ] abd pain [ ] dysphagia   : [ ] negative [ ] dysuria [ ] nocturia [ ] hematuria [ ] increased urinary frequency  Musculoskeletal: [ ] negative [ ] back pain [ ] myalgias [ ] arthralgias [ ] fracture  Skin: [ ] negative [ ] rash [ ] itch  Neurological: [ ] negative [ ] headache [ ] dizziness [ ] syncope [ ] weakness [ ] numbness  Psychiatric: [ ] negative [ ] anxiety [ ] depression  Endocrine: [ ] negative [ ] diabetes [ ] thyroid problem  Hematologic/Lymphatic: [ ] negative [ ] anemia [ ] bleeding problem  Allergic/Immunologic: [ ] negative [ ] itchy eyes [ ] nasal discharge [ ] hives [ ] angioedema  [ ] All other systems negative  [ ] Unable to assess ROS because ________    OBJECTIVE:  ICU Vital Signs Last 24 Hrs  T(C): 36.8 (2020 21:02), Max: 36.8 (2020 21:02)  T(F): 98.2 (2020 21:02), Max: 98.2 (2020 21:02)  HR: 110 (2020 23:08) (104 - 116)  BP: 193/74 (2020 23:08) (132/80 - 193/74)  BP(mean): --  ABP: --  ABP(mean): --  RR: 18 (2020 23:08) (16 - 18)  SpO2: 99% (2020 23:08) (98% - 99%)         @ 07:01  -   @ 23:16  --------------------------------------------------------  IN: 0 mL / OUT: 2100 mL / NET: -2100 mL      CAPILLARY BLOOD GLUCOSE          PHYSICAL EXAM:  General:   HEENT:   Lymph Nodes:  Neck:   Respiratory:   Cardiovascular:   Abdomen:   Extremities:   Skin:   Neurological:  Psychiatry:    LINES:     HOSPITAL MEDICATIONS:  Standing Meds:  sodium chloride 0.9% Bolus 500 milliLiter(s) IV Bolus once      PRN Meds:      LABS:                        12.1   4.61  )-----------( 81       ( 2020 19:05 )             37.0     Hgb Trend: 12.1<--  1102    140  |  109<H>  |  125<H>  ----------------------------<  107<H>  6.8<HH>   |  16<L>  |  6.32<H>    Ca    9.3      2020 21:00  Phos  5.6       Mg     2.9         TPro  6.9  /  Alb  3.7  /  TBili  0.2  /  DBili  x   /  AST  14  /  ALT  21  /  AlkPhos  40  11    Creatinine Trend: 6.32<--, 7.56<--    Urinalysis Basic - ( 2020 19:05 )    Color: LIGHT YELLOW / Appearance: CLEAR / S.013 / pH: 6.0  Gluc: NEGATIVE / Ketone: NEGATIVE  / Bili: NEGATIVE / Urobili: NORMAL   Blood: MODERATE / Protein: NEGATIVE / Nitrite: NEGATIVE   Leuk Esterase: NEGATIVE / RBC: 11-25 / WBC 3-5   Sq Epi: OCC / Non Sq Epi: x / Bacteria: NEGATIVE            MICROBIOLOGY:     RADIOLOGY:  [ ] Reviewed and interpreted by me    EKG: CHIEF COMPLAINT: Leg swelling    HPI:  66yo M hx of HTN, depression, prostate cancer s/p radiation therapy on 10/26 presenting with gradually worsening bilateral   lower extremity edema swelling. Patient reported that he has noted increased swelling for the past 3-4 days. He has never had  similar issues in the past. In the ED he was found to have DAYSI and hyperkalemia and was noted to have significant urinary  retention. BP also elevated. Reported that he is still making urine. No other issues or complaints.       REVIEW OF SYSTEMS:  Constitutional: denies fevers, chills, night sweats, weight loss  HEENT: denies visual changes, hearing changes, rhinitis, odynophagia, or dysphagia  Cardiovascular: denies palpitations, chest pain, +edema  Respiratory: denies SOB, wheezing  Gastrointestinal: denies N/V/D, abdominal pain, hematochezia, melena  : denies dysuria, hematuria  MSK: denies weakness, joint pain  Neuro: no numbness or tingling  Psych: no depression or anxiety  Skin: denies new rashes or masses      PAST MEDICAL & SURGICAL HISTORY:  Prostate cancer    Allergies    No Known Allergies    Intolerances        HOME MEDICATIONS:  OBJECTIVE:  ICU Vital Signs Last 24 Hrs  T(C): 36.8 (2020 21:02), Max: 36.8 (2020 21:02)  T(F): 98.2 (2020 21:02), Max: 98.2 (2020 21:02)  HR: 110 (2020 23:08) (104 - 116)  BP: 193/74 (2020 23:08) (132/80 - 193/74)  BP(mean): --  ABP: --  ABP(mean): --  RR: 18 (2020 23:08) (16 - 18)  SpO2: 99% (2020 23:08) (98% - 99%)         @ 07:01  -   @ 23:16  --------------------------------------------------------  IN: 0 mL / OUT: 2100 mL / NET: -2100 mL      CAPILLARY BLOOD GLUCOSE          PHYSICAL EXAM:  GENERAL: NAD, well-developed  HEAD: Atraumatic, Normocephalic  EYES: EOMI, PERRLA, conjunctiva and sclera clear  NECK: Supple, No JVD  CHEST/LUNG: Clear to auscultation bilaterally; No wheezes/rales/rhonchi  HEART: Regular rate and rhythm; No murmurs, rubs, or gallops  ABDOMEN: Soft, Nontender, Nondistended; Bowel sounds present  EXTREMITIES:  2+ dP pulses b/l, No clubbing, cyanosis. 2+ pitting edema  PSYCH: reactive affect  NEUROLOGY: AAOx3, non-focal  SKIN: No rashes or lesions    LINES:     HOSPITAL MEDICATIONS:  Standing Meds:  sodium chloride 0.9% Bolus 500 milliLiter(s) IV Bolus once      PRN Meds:      LABS:                        12.1   4.61  )-----------( 81       ( 2020 19:05 )             37.0     Hgb Trend: 12.1<--  11-02    140  |  109<H>  |  125<H>  ----------------------------<  107<H>  6.8<HH>   |  16<L>  |  6.32<H>    Ca    9.3      2020 21:00  Phos  5.6     11-02  Mg     2.9     11-02    TPro  6.9  /  Alb  3.7  /  TBili  0.2  /  DBili  x   /  AST  14  /  ALT  21  /  AlkPhos  40  11-02    Creatinine Trend: 6.32<--, 7.56<--    Urinalysis Basic - ( 2020 19:05 )    Color: LIGHT YELLOW / Appearance: CLEAR / S.013 / pH: 6.0  Gluc: NEGATIVE / Ketone: NEGATIVE  / Bili: NEGATIVE / Urobili: NORMAL   Blood: MODERATE / Protein: NEGATIVE / Nitrite: NEGATIVE   Leuk Esterase: NEGATIVE / RBC: 11-25 / WBC 3-5   Sq Epi: OCC / Non Sq Epi: x / Bacteria: NEGATIVE            MICROBIOLOGY:     RADIOLOGY:  [ ] Reviewed and interpreted by me    EKG:

## 2020-11-02 NOTE — ED CLERICAL - NS ED CLERK NOTE PRE-ARRIVAL INFORMATION; ADDITIONAL PRE-ARRIVAL INFORMATION
S/P prostate radiation- last treatment 2 weeks ago. Catheter removed 10/19.  Today bladder distended, swelling of LE.  Dr Iqbal- urologist.  Hx HTN, peripheral neuropathy

## 2020-11-03 DIAGNOSIS — C61 MALIGNANT NEOPLASM OF PROSTATE: ICD-10-CM

## 2020-11-03 DIAGNOSIS — N17.9 ACUTE KIDNEY FAILURE, UNSPECIFIED: ICD-10-CM

## 2020-11-03 DIAGNOSIS — E87.5 HYPERKALEMIA: ICD-10-CM

## 2020-11-03 DIAGNOSIS — N13.9 OBSTRUCTIVE AND REFLUX UROPATHY, UNSPECIFIED: ICD-10-CM

## 2020-11-03 DIAGNOSIS — Z98.890 OTHER SPECIFIED POSTPROCEDURAL STATES: Chronic | ICD-10-CM

## 2020-11-03 DIAGNOSIS — Z29.9 ENCOUNTER FOR PROPHYLACTIC MEASURES, UNSPECIFIED: ICD-10-CM

## 2020-11-03 DIAGNOSIS — I10 ESSENTIAL (PRIMARY) HYPERTENSION: ICD-10-CM

## 2020-11-03 DIAGNOSIS — M79.89 OTHER SPECIFIED SOFT TISSUE DISORDERS: ICD-10-CM

## 2020-11-03 DIAGNOSIS — M96.1 POSTLAMINECTOMY SYNDROME, NOT ELSEWHERE CLASSIFIED: Chronic | ICD-10-CM

## 2020-11-03 DIAGNOSIS — Z79.899 OTHER LONG TERM (CURRENT) DRUG THERAPY: ICD-10-CM

## 2020-11-03 LAB
ALBUMIN SERPL ELPH-MCNC: 3.8 G/DL — SIGNIFICANT CHANGE UP (ref 3.3–5)
ALP SERPL-CCNC: 43 U/L — SIGNIFICANT CHANGE UP (ref 40–120)
ALT FLD-CCNC: 23 U/L — SIGNIFICANT CHANGE UP (ref 4–41)
ANION GAP SERPL CALC-SCNC: 10 MMO/L — SIGNIFICANT CHANGE UP (ref 7–14)
ANION GAP SERPL CALC-SCNC: 11 MMO/L — SIGNIFICANT CHANGE UP (ref 7–14)
ANION GAP SERPL CALC-SCNC: 8 MMO/L — SIGNIFICANT CHANGE UP (ref 7–14)
ANION GAP SERPL CALC-SCNC: 8 MMO/L — SIGNIFICANT CHANGE UP (ref 7–14)
AST SERPL-CCNC: 11 U/L — SIGNIFICANT CHANGE UP (ref 4–40)
BILIRUB SERPL-MCNC: 0.3 MG/DL — SIGNIFICANT CHANGE UP (ref 0.2–1.2)
BUN SERPL-MCNC: 103 MG/DL — HIGH (ref 7–23)
BUN SERPL-MCNC: 51 MG/DL — HIGH (ref 7–23)
BUN SERPL-MCNC: 64 MG/DL — HIGH (ref 7–23)
BUN SERPL-MCNC: 89 MG/DL — HIGH (ref 7–23)
CALCIUM SERPL-MCNC: 9.5 MG/DL — SIGNIFICANT CHANGE UP (ref 8.4–10.5)
CALCIUM SERPL-MCNC: 9.6 MG/DL — SIGNIFICANT CHANGE UP (ref 8.4–10.5)
CHLORIDE SERPL-SCNC: 116 MMOL/L — HIGH (ref 98–107)
CHLORIDE SERPL-SCNC: 117 MMOL/L — HIGH (ref 98–107)
CO2 SERPL-SCNC: 16 MMOL/L — LOW (ref 22–31)
CO2 SERPL-SCNC: 18 MMOL/L — LOW (ref 22–31)
CO2 SERPL-SCNC: 21 MMOL/L — LOW (ref 22–31)
CO2 SERPL-SCNC: 23 MMOL/L — SIGNIFICANT CHANGE UP (ref 22–31)
CREAT ?TM UR-MCNC: 42.9 MG/DL — SIGNIFICANT CHANGE UP
CREAT SERPL-MCNC: 1.66 MG/DL — HIGH (ref 0.5–1.3)
CREAT SERPL-MCNC: 2.21 MG/DL — HIGH (ref 0.5–1.3)
CREAT SERPL-MCNC: 3.58 MG/DL — HIGH (ref 0.5–1.3)
CREAT SERPL-MCNC: 4.51 MG/DL — HIGH (ref 0.5–1.3)
CULTURE RESULTS: NO GROWTH — SIGNIFICANT CHANGE UP
FERRITIN SERPL-MCNC: 639.1 NG/ML — HIGH (ref 30–400)
GLUCOSE SERPL-MCNC: 103 MG/DL — HIGH (ref 70–99)
GLUCOSE SERPL-MCNC: 113 MG/DL — HIGH (ref 70–99)
GLUCOSE SERPL-MCNC: 126 MG/DL — HIGH (ref 70–99)
GLUCOSE SERPL-MCNC: 130 MG/DL — HIGH (ref 70–99)
HBA1C BLD-MCNC: 5.6 % — SIGNIFICANT CHANGE UP (ref 4–5.6)
HCT VFR BLD CALC: 39.7 % — SIGNIFICANT CHANGE UP (ref 39–50)
HGB BLD-MCNC: 13.1 G/DL — SIGNIFICANT CHANGE UP (ref 13–17)
IRON SATN MFR SERPL: 183 UG/DL — SIGNIFICANT CHANGE UP (ref 155–535)
IRON SATN MFR SERPL: 56 UG/DL — SIGNIFICANT CHANGE UP (ref 45–165)
MAGNESIUM SERPL-MCNC: 2.2 MG/DL — SIGNIFICANT CHANGE UP (ref 1.6–2.6)
MAGNESIUM SERPL-MCNC: 2.4 MG/DL — SIGNIFICANT CHANGE UP (ref 1.6–2.6)
MAGNESIUM SERPL-MCNC: 2.6 MG/DL — SIGNIFICANT CHANGE UP (ref 1.6–2.6)
MCHC RBC-ENTMCNC: 29.4 PG — SIGNIFICANT CHANGE UP (ref 27–34)
MCHC RBC-ENTMCNC: 33 % — SIGNIFICANT CHANGE UP (ref 32–36)
MCV RBC AUTO: 89 FL — SIGNIFICANT CHANGE UP (ref 80–100)
NRBC # FLD: 0 K/UL — SIGNIFICANT CHANGE UP (ref 0–0)
PHOSPHATE SERPL-MCNC: 3 MG/DL — SIGNIFICANT CHANGE UP (ref 2.5–4.5)
PHOSPHATE SERPL-MCNC: 4.1 MG/DL — SIGNIFICANT CHANGE UP (ref 2.5–4.5)
PHOSPHATE SERPL-MCNC: 5.1 MG/DL — HIGH (ref 2.5–4.5)
PLATELET # BLD AUTO: 97 K/UL — LOW (ref 150–400)
PMV BLD: 10 FL — SIGNIFICANT CHANGE UP (ref 7–13)
POTASSIUM SERPL-MCNC: 5.3 MMOL/L — SIGNIFICANT CHANGE UP (ref 3.5–5.3)
POTASSIUM SERPL-MCNC: 5.5 MMOL/L — HIGH (ref 3.5–5.3)
POTASSIUM SERPL-MCNC: 5.9 MMOL/L — HIGH (ref 3.5–5.3)
POTASSIUM SERPL-MCNC: 6 MMOL/L — HIGH (ref 3.5–5.3)
POTASSIUM SERPL-SCNC: 5.3 MMOL/L — SIGNIFICANT CHANGE UP (ref 3.5–5.3)
POTASSIUM SERPL-SCNC: 5.5 MMOL/L — HIGH (ref 3.5–5.3)
POTASSIUM SERPL-SCNC: 5.9 MMOL/L — HIGH (ref 3.5–5.3)
POTASSIUM SERPL-SCNC: 6 MMOL/L — HIGH (ref 3.5–5.3)
POTASSIUM UR-SCNC: 20.1 MMOL/L — SIGNIFICANT CHANGE UP
PROT SERPL-MCNC: 6.9 G/DL — SIGNIFICANT CHANGE UP (ref 6–8.3)
RBC # BLD: 4.46 M/UL — SIGNIFICANT CHANGE UP (ref 4.2–5.8)
RBC # FLD: 13.6 % — SIGNIFICANT CHANGE UP (ref 10.3–14.5)
SARS-COV-2 IGG SERPL QL IA: NEGATIVE — SIGNIFICANT CHANGE UP
SARS-COV-2 IGM SERPL IA-ACNC: <3.8 AU/ML — SIGNIFICANT CHANGE UP
SARS-COV-2 RNA SPEC QL NAA+PROBE: SIGNIFICANT CHANGE UP
SODIUM SERPL-SCNC: 143 MMOL/L — SIGNIFICANT CHANGE UP (ref 135–145)
SODIUM SERPL-SCNC: 144 MMOL/L — SIGNIFICANT CHANGE UP (ref 135–145)
SODIUM SERPL-SCNC: 146 MMOL/L — HIGH (ref 135–145)
SODIUM SERPL-SCNC: 147 MMOL/L — HIGH (ref 135–145)
SODIUM UR-SCNC: 80 MMOL/L — SIGNIFICANT CHANGE UP
SPECIMEN SOURCE: SIGNIFICANT CHANGE UP
UIBC SERPL-MCNC: 126.6 UG/DL — SIGNIFICANT CHANGE UP (ref 110–370)
URATE UR-MCNC: 17.7 MG/DL — SIGNIFICANT CHANGE UP
WBC # BLD: 4.37 K/UL — SIGNIFICANT CHANGE UP (ref 3.8–10.5)
WBC # FLD AUTO: 4.37 K/UL — SIGNIFICANT CHANGE UP (ref 3.8–10.5)

## 2020-11-03 PROCEDURE — 12345: CPT | Mod: NC,GC

## 2020-11-03 PROCEDURE — 93306 TTE W/DOPPLER COMPLETE: CPT | Mod: 26

## 2020-11-03 PROCEDURE — 99223 1ST HOSP IP/OBS HIGH 75: CPT | Mod: GC

## 2020-11-03 PROCEDURE — 99223 1ST HOSP IP/OBS HIGH 75: CPT

## 2020-11-03 PROCEDURE — 76770 US EXAM ABDO BACK WALL COMP: CPT | Mod: 26

## 2020-11-03 RX ORDER — SODIUM CHLORIDE 9 MG/ML
1000 INJECTION, SOLUTION INTRAVENOUS
Refills: 0 | Status: DISCONTINUED | OUTPATIENT
Start: 2020-11-03 | End: 2020-11-04

## 2020-11-03 RX ORDER — DULOXETINE HYDROCHLORIDE 30 MG/1
60 CAPSULE, DELAYED RELEASE ORAL DAILY
Refills: 0 | Status: DISCONTINUED | OUTPATIENT
Start: 2020-11-03 | End: 2020-11-05

## 2020-11-03 RX ORDER — DULOXETINE HYDROCHLORIDE 30 MG/1
1 CAPSULE, DELAYED RELEASE ORAL
Qty: 0 | Refills: 0 | DISCHARGE

## 2020-11-03 RX ORDER — SODIUM CHLORIDE 9 MG/ML
1000 INJECTION, SOLUTION INTRAVENOUS
Refills: 0 | Status: DISCONTINUED | OUTPATIENT
Start: 2020-11-03 | End: 2020-11-03

## 2020-11-03 RX ORDER — SODIUM ZIRCONIUM CYCLOSILICATE 10 G/10G
10 POWDER, FOR SUSPENSION ORAL EVERY 8 HOURS
Refills: 0 | Status: DISCONTINUED | OUTPATIENT
Start: 2020-11-03 | End: 2020-11-03

## 2020-11-03 RX ORDER — OXYBUTYNIN CHLORIDE 5 MG
1 TABLET ORAL
Qty: 0 | Refills: 0 | DISCHARGE

## 2020-11-03 RX ORDER — SENNA PLUS 8.6 MG/1
2 TABLET ORAL EVERY 12 HOURS
Refills: 0 | Status: DISCONTINUED | OUTPATIENT
Start: 2020-11-03 | End: 2020-11-05

## 2020-11-03 RX ORDER — POLYETHYLENE GLYCOL 3350 17 G/17G
17 POWDER, FOR SOLUTION ORAL
Refills: 0 | Status: DISCONTINUED | OUTPATIENT
Start: 2020-11-03 | End: 2020-11-04

## 2020-11-03 RX ORDER — SODIUM ZIRCONIUM CYCLOSILICATE 10 G/10G
10 POWDER, FOR SUSPENSION ORAL ONCE
Refills: 0 | Status: COMPLETED | OUTPATIENT
Start: 2020-11-03 | End: 2020-11-03

## 2020-11-03 RX ORDER — FINASTERIDE 5 MG/1
5 TABLET, FILM COATED ORAL DAILY
Refills: 0 | Status: DISCONTINUED | OUTPATIENT
Start: 2020-11-03 | End: 2020-11-05

## 2020-11-03 RX ORDER — INSULIN HUMAN 100 [IU]/ML
5 INJECTION, SOLUTION SUBCUTANEOUS ONCE
Refills: 0 | Status: COMPLETED | OUTPATIENT
Start: 2020-11-03 | End: 2020-11-03

## 2020-11-03 RX ORDER — HEPARIN SODIUM 5000 [USP'U]/ML
5000 INJECTION INTRAVENOUS; SUBCUTANEOUS EVERY 8 HOURS
Refills: 0 | Status: DISCONTINUED | OUTPATIENT
Start: 2020-11-03 | End: 2020-11-05

## 2020-11-03 RX ORDER — TAMSULOSIN HYDROCHLORIDE 0.4 MG/1
0.4 CAPSULE ORAL
Refills: 0 | Status: DISCONTINUED | OUTPATIENT
Start: 2020-11-03 | End: 2020-11-05

## 2020-11-03 RX ORDER — FUROSEMIDE 40 MG
80 TABLET ORAL ONCE
Refills: 0 | Status: DISCONTINUED | OUTPATIENT
Start: 2020-11-03 | End: 2020-11-03

## 2020-11-03 RX ORDER — HYDRALAZINE HCL 50 MG
25 TABLET ORAL ONCE
Refills: 0 | Status: COMPLETED | OUTPATIENT
Start: 2020-11-03 | End: 2020-11-03

## 2020-11-03 RX ORDER — GABAPENTIN 400 MG/1
300 CAPSULE ORAL
Refills: 0 | Status: DISCONTINUED | OUTPATIENT
Start: 2020-11-03 | End: 2020-11-05

## 2020-11-03 RX ORDER — OXYBUTYNIN CHLORIDE 5 MG
5 TABLET ORAL DAILY
Refills: 0 | Status: DISCONTINUED | OUTPATIENT
Start: 2020-11-03 | End: 2020-11-05

## 2020-11-03 RX ORDER — LABETALOL HCL 100 MG
10 TABLET ORAL ONCE
Refills: 0 | Status: DISCONTINUED | OUTPATIENT
Start: 2020-11-03 | End: 2020-11-03

## 2020-11-03 RX ORDER — GABAPENTIN 400 MG/1
300 CAPSULE ORAL
Qty: 0 | Refills: 0 | DISCHARGE

## 2020-11-03 RX ORDER — TAMSULOSIN HYDROCHLORIDE 0.4 MG/1
1 CAPSULE ORAL
Qty: 0 | Refills: 0 | DISCHARGE

## 2020-11-03 RX ORDER — DULOXETINE HYDROCHLORIDE 30 MG/1
0 CAPSULE, DELAYED RELEASE ORAL
Qty: 0 | Refills: 0 | DISCHARGE

## 2020-11-03 RX ORDER — TADALAFIL 10 MG/1
1 TABLET, FILM COATED ORAL
Qty: 0 | Refills: 0 | DISCHARGE

## 2020-11-03 RX ORDER — DEXTROSE 50 % IN WATER 50 %
50 SYRINGE (ML) INTRAVENOUS ONCE
Refills: 0 | Status: COMPLETED | OUTPATIENT
Start: 2020-11-03 | End: 2020-11-03

## 2020-11-03 RX ORDER — DEXAMETHASONE 0.5 MG/5ML
4 ELIXIR ORAL
Qty: 0 | Refills: 0 | DISCHARGE

## 2020-11-03 RX ORDER — FINASTERIDE 5 MG/1
1 TABLET, FILM COATED ORAL
Qty: 0 | Refills: 0 | DISCHARGE

## 2020-11-03 RX ADMIN — SODIUM ZIRCONIUM CYCLOSILICATE 10 GRAM(S): 10 POWDER, FOR SUSPENSION ORAL at 21:29

## 2020-11-03 RX ADMIN — TAMSULOSIN HYDROCHLORIDE 0.4 MILLIGRAM(S): 0.4 CAPSULE ORAL at 17:55

## 2020-11-03 RX ADMIN — TAMSULOSIN HYDROCHLORIDE 0.4 MILLIGRAM(S): 0.4 CAPSULE ORAL at 07:29

## 2020-11-03 RX ADMIN — INSULIN HUMAN 5 UNIT(S): 100 INJECTION, SOLUTION SUBCUTANEOUS at 08:52

## 2020-11-03 RX ADMIN — Medication 25 MILLIGRAM(S): at 00:34

## 2020-11-03 RX ADMIN — POLYETHYLENE GLYCOL 3350 17 GRAM(S): 17 POWDER, FOR SOLUTION ORAL at 13:31

## 2020-11-03 RX ADMIN — DULOXETINE HYDROCHLORIDE 60 MILLIGRAM(S): 30 CAPSULE, DELAYED RELEASE ORAL at 17:55

## 2020-11-03 RX ADMIN — SODIUM CHLORIDE 100 MILLILITER(S): 9 INJECTION, SOLUTION INTRAVENOUS at 08:28

## 2020-11-03 RX ADMIN — FINASTERIDE 5 MILLIGRAM(S): 5 TABLET, FILM COATED ORAL at 13:30

## 2020-11-03 RX ADMIN — Medication 50 MILLILITER(S): at 08:29

## 2020-11-03 RX ADMIN — SENNA PLUS 2 TABLET(S): 8.6 TABLET ORAL at 18:03

## 2020-11-03 RX ADMIN — GABAPENTIN 300 MILLIGRAM(S): 400 CAPSULE ORAL at 17:55

## 2020-11-03 RX ADMIN — HEPARIN SODIUM 5000 UNIT(S): 5000 INJECTION INTRAVENOUS; SUBCUTANEOUS at 13:31

## 2020-11-03 RX ADMIN — Medication 5 MILLIGRAM(S): at 13:30

## 2020-11-03 RX ADMIN — SODIUM CHLORIDE 75 MILLILITER(S): 9 INJECTION, SOLUTION INTRAVENOUS at 21:47

## 2020-11-03 RX ADMIN — HEPARIN SODIUM 5000 UNIT(S): 5000 INJECTION INTRAVENOUS; SUBCUTANEOUS at 07:29

## 2020-11-03 RX ADMIN — SODIUM CHLORIDE 100 MILLILITER(S): 9 INJECTION, SOLUTION INTRAVENOUS at 13:31

## 2020-11-03 RX ADMIN — HEPARIN SODIUM 5000 UNIT(S): 5000 INJECTION INTRAVENOUS; SUBCUTANEOUS at 21:29

## 2020-11-03 RX ADMIN — SODIUM ZIRCONIUM CYCLOSILICATE 10 GRAM(S): 10 POWDER, FOR SUSPENSION ORAL at 08:28

## 2020-11-03 NOTE — H&P ADULT - NSICDXPASTMEDICALHX_GEN_ALL_CORE_FT
PAST MEDICAL HISTORY:  Prostate cancer      PAST MEDICAL HISTORY:  Hypertension     Prostate cancer

## 2020-11-03 NOTE — H&P ADULT - HISTORY OF PRESENT ILLNESS
65M with PMH HTN, depression, prostate adenocarcinoma s/p radiation therapy on presents to [facility] ED c/o b/l LE edema. Pt was diagnosed December 2019. Patient underwent prostate biopsy with Dr. Alexia Muñoz on 12/03/2019, pathology report demonstrated adenocarcinoma of the prostate with [place] score 3 + 4 =7 involving 5 % - 30% of the tissue. Perineural invasion was identified on right apex medial. Pt states he got 5 courses RT between October-November. His last treatment was 2 weeks ago. Pt endorses the progression of LE edema/ It has been worsening over the past couple days and has made it difficult for the patient to ambulate. He also notes abdominal distention. He makes urine, and even endorses incontinence.    In ED, given calcium gluconate, hydralazine, humulin, lisinopril, lokelma, 500cc NS

## 2020-11-03 NOTE — CONSULT NOTE ADULT - ASSESSMENT
65M with PMH HTN, depression, prostate adenocarcinoma s/p radiation therapy on presents to [facility] ED c/o b/l LE edema. previous hx of urinary rentention had a Maldonado until 2 weeks ago. At ED found hyperkalemic (7.9) and sCr 7.5    nephrology consulted for DAYSI and hyperkalemia

## 2020-11-03 NOTE — H&P ADULT - NSHPPHYSICALEXAM_GEN_ALL_CORE
GENERAL: NAD, lying in bed comfortably  HEAD:  Atraumatic, Normocephalic  EYES: EOMI, PERRLA, conjunctiva and sclera clear  ENT: Moist mucous membranes  NECK: Supple, No JVD  CHEST/LUNG: Clear to auscultation bilaterally; No rales, rhonchi, wheezing, or rubs. Unlabored respirations  HEART: Regular rate and rhythm; No murmurs, rubs, or gallops  ABDOMEN: Bowel sounds present; Soft, Nontender, Nondistended. No hepatomegaly  EXTREMITIES:  2+ Peripheral Pulses, brisk capillary refill. No clubbing, cyanosis, or edema  NERVOUS SYSTEM:  Alert & Oriented X3, speech clear. No deficits   MSK: FROM all 4 extremities, full and equal strength  SKIN: No rashes or lesions GENERAL: NAD, lying in bed comfortably  HEAD:  Atraumatic, Normocephalic  EYES: EOMI, PERRLA, conjunctiva and sclera clear  ENT: Moist mucous membranes  NECK: Supple, No JVD  CHEST/LUNG: Clear to auscultation bilaterally; No rales, rhonchi, wheezing, or rubs. Unlabored respirations  HEART: Regular rate and rhythm; No murmurs, rubs, or gallops  ABDOMEN: Bowel sounds present; Soft, Nontender, + distended. + hepatomegaly  EXTREMITIES:  2+ Peripheral Pulses, brisk capillary refill. 3-4+ pitting edema to knees b/l   NERVOUS SYSTEM:  Alert & Oriented X3, speech clear. No deficits   MSK: FROM all 4 extremities, full and equal strength  SKIN: No rashes or lesions GENERAL: NAD, lying in bed comfortably  HEAD:  Atraumatic, Normocephalic  EYES: EOMI, PERRLA, conjunctiva and sclera clear  ENT: Moist mucous membranes  NECK: Supple, No JVD. s/p cervical laminectomy, scar visible on midline  CHEST/LUNG: Clear to auscultation bilaterally; No rales, rhonchi, wheezing, or rubs. Unlabored respirations  HEART: Regular rate and rhythm; No murmurs, rubs, or gallops  ABDOMEN: Bowel sounds present; Soft, Nontender, + distended. + hepatomegaly  EXTREMITIES:  2+ Peripheral Pulses, brisk capillary refill. 3-4+ pitting edema to knees b/l   NERVOUS SYSTEM:  Alert & Oriented X3, speech clear. No deficits   MSK: FROM all 4 extremities, full and equal strength  SKIN: No rashes or lesions

## 2020-11-03 NOTE — PROVIDER CONTACT NOTE (OTHER) - BACKGROUND
Patient admitted on 11/2/2020 with obstructive and reflux uropathy. Lower extremity edema and abdominal pain upon admission.

## 2020-11-03 NOTE — H&P ADULT - NSHPREVIEWOFSYSTEMS_GEN_ALL_CORE
REVIEW OF SYSTEMS:    CONSTITUTIONAL: No weakness, fevers or chills  EYES/ENT: No visual changes;  No vertigo or throat pain   NECK: No pain or stiffness  RESPIRATORY: No cough, wheezing, hemoptysis; No shortness of breath  CARDIOVASCULAR: No chest pain or palpitations  GASTROINTESTINAL: No abdominal or epigastric pain. No nausea, vomiting, or hematemesis; No diarrhea or constipation. No melena or hematochezia.  GENITOURINARY: No dysuria, frequency or hematuria  NEUROLOGICAL: No numbness or weakness  All other review of systems is negative unless indicated above. REVIEW OF SYSTEMS:    CONSTITUTIONAL: No weakness, fevers or chills  EYES/ENT: No visual changes;  No vertigo or throat pain   NECK: No pain or stiffness  RESPIRATORY: No cough, wheezing, hemoptysis; No shortness of breath  CARDIOVASCULAR: No chest pain or palpitations  GASTROINTESTINAL: No abdominal or epigastric pain. No nausea, vomiting, or hematemesis; + diarrhea, + constipation. No melena or hematochezia.  GENITOURINARY: No dysuria, + frequency, no hematuria  NEUROLOGICAL: No numbness or weakness  All other review of systems is negative unless indicated above.

## 2020-11-03 NOTE — H&P ADULT - PROBLEM SELECTOR PLAN 2
- Maldonado in place  - Plan as above - Likely 2/2 prostate radiation  - Maldonado in place, drained about 5L  - Monitor I/Os  - Plan as above

## 2020-11-03 NOTE — PROGRESS NOTE ADULT - ATTENDING COMMENTS
Patient was seen and examined personally by me. I have discussed the plan and reviewed the resident's note and agree with the above physical exam findings including assessment and plan except as indicated below.    Briefly, 65M hx of prostate cancer s/p radiation therapy (last 10/2020), prior indwelling catheter (removed 10/19 outpatient), depression, HTN who presents with worsening LE swelling a/w acute renal failure likely 2/2 obstructive uropathy in setting of radiation. No indication for urgent HD at this time, diuresing after Kim placed (net negative 2L in 24 hours, 8L for total stay), creatinine improving with resolution of hyperkalemia. Monitor closely for post obstructive diuresis with BMP BID, c/w IVF. f/u renal sonogram, will check TTE to r/o cardiac etiology of edema, f/u nephrology recommendations. Team to reach out to outpatient urologist, will likely need to be discharged with kim. Hold home lisinopril, gabapentin, cymbalta pending improving of renal function, resume cymbalta at lower dose if possible based on creatinine clearance to prevent SSRI withdrawal. Patient was seen and examined personally by me. I have discussed the plan and reviewed the resident's note and agree with the above physical exam findings including assessment and plan except as indicated below.    Briefly, 65M hx of prostate cancer s/p radiation therapy (last 10/2020), prior indwelling catheter (removed 10/19 outpatient), depression, HTN who presents with worsening LE swelling a/w acute renal failure likely 2/2 obstructive uropathy in setting of radiation. No indication for urgent HD at this time, diuresing after Kim placed (net negative 2L in 24 hours, 8L for total stay), creatinine improving with resolution of hyperkalemia. Monitor closely for post obstructive diuresis with BMP BID, c/w IVF. f/u renal sonogram, will check TTE to r/o cardiac etiology of edema, f/u nephrology recommendations. Team to reach out to outpatient urologist, will likely need to be discharged with kim. Hold home lisinopril, gabapentin, cymbalta pending improving of renal function, resume cymbalta at lower dose if possible based on creatinine clearance to prevent SSRI withdrawal.    Dispo - home pending improvement of renal function, likely with kim.

## 2020-11-03 NOTE — CONSULT NOTE ADULT - PROBLEM SELECTOR RECOMMENDATION 2
potassium at 7.9 on admission, secondary to urinary obstruction. s/p medical management - Maldonado cath placed and now hyperkalemia resolved. Continue to monitor electrolytes

## 2020-11-03 NOTE — H&P ADULT - PROBLEM SELECTOR PLAN 6
- diet: npo  - dvt ppx: hep - due to Cr clearance, held cialis, cymbalta, lisinopril, oxybutynin, gabapentin

## 2020-11-03 NOTE — CONSULT NOTE ADULT - PROBLEM SELECTOR RECOMMENDATION 9
Pt with DAYSI in the setting of obstruction, previous hx of urinary retention and kim use. [facility]- At ED found hyperkalemic (7.9) and sCr 7.5 (11/2) - Kim cath placed and 1L of urine drained. Today sCr has markedly improved to 3.58 11/3. Agree with Kim catheter placement. Encouraged adequate po intake.  Expect kidney function to return to baseline. Monitor labs and urine output. Avoid NSAIDs, ACEI/ARBS, RCA and nephrotoxins. Dose medications as per eGFR.

## 2020-11-03 NOTE — H&P ADULT - ASSESSMENT
65M with PMH HTN, depression, prostate adenocarcinoma s/p radiation therapy on presents to the hospital with bilateral LE edema, found to be in acute renal failure. 65M with PMH HTN, depression, prostate adenocarcinoma s/p radiation therapy on presents to the hospital with bilateral LE edema, found to be in acute renal failure, likely 2/2 urinary obstruction in setting of prostate cancer radiation.

## 2020-11-03 NOTE — CONSULT NOTE ADULT - SUBJECTIVE AND OBJECTIVE BOX
[facility] DIVISION OF KIDNEY DISEASES AND HYPERTENSION -- [phone]  -- INITIAL CONSULT NOTE  --------------------------------------------------------------------------------  If any questions, please feel free to contact me  NS pager: [phone], [facility]: [phone]  Eros Ryder M.D.  Nephrology Fellow    (After 5 pm or on weekends please page the on-call fellow)  --------------------------------------------------------------------------------    HPI:  65M with PMH HTN, depression, prostate adenocarcinoma s/p radiation therapy on presents to [facility] ED c/o b/l LE edema. Pt states he got 5 courses RT for prostate Ca between October-November. During this treatment he was found with urinary retention and had a kim placed, which was removed about 2 weeks ago. Pt endorses that since removing kim cath noted progression of LE edema. He also noted abdominal distention. Denies abdominal pain. He reports that is able to make urine and endorsed incontinence. also reports that was taking Aleve to "help with the inflammation" Denies fever, chills, chest pain, hematuria, SOB, vomiting, diarrhea. At ED found hyperkalemic (7.9) and sCr 7.5 - Kim was placed with 1L of Urine drained.     nephrology consulted for DAYSI and hyperkalemia            PAST HISTORY  --------------------------------------------------------------------------------  PAST MEDICAL & SURGICAL HISTORY:  Hypertension    Prostate cancer    S/P laminectomy  cervical      FAMILY HISTORY:    PAST SOCIAL HISTORY:    ALLERGIES & MEDICATIONS  --------------------------------------------------------------------------------  Allergies    No Known Allergies    Intolerances      Standing Inpatient Medications  finasteride 5 milliGRAM(s) Oral daily  heparin   Injectable 5000 Unit(s) SubCutaneous every 8 hours  oxybutynin 5 milliGRAM(s) Oral daily  sodium chloride 0.45%. 1000 milliLiter(s) IV Continuous <Continuous>  sodium zirconium cyclosilicate 10 Gram(s) Oral every 8 hours  tamsulosin 0.4 milliGRAM(s) Oral two times a day    PRN Inpatient Medications      REVIEW OF SYSTEMS  --------------------------------------------------------------------------------  Gen: No fevers/chills  Skin: No rashes  Respiratory: No dyspnea, cough  CV: No chest pain  GI: +ve abdominal distention-  No abdominal pain, diarrhea  : incontinence, No dysuria, hematuria  MSK: +ve worsening LE  edema    All other systems were reviewed and are negative, except as noted.    VITALS/PHYSICAL EXAM  --------------------------------------------------------------------------------  T(C): 36.4 (11-03-20 @ 05:00), Max: 37 (11-03-20 @ 01:52)  HR: 110 (11-03-20 @ 05:00) (104 - 120)  BP: 145/68 (11-03-20 @ 05:00) (132/80 - 209/93)  RR: 18 (11-03-20 @ 05:00) (16 - 20)  SpO2: 99% (11-03-20 @ 05:00) (98% - 100%)  Wt(kg): --        11-02-20 @ 07:01  -  11-03-20 @ 07:00  --------------------------------------------------------  IN: 0 mL / OUT: 6100 mL / NET: -6100 mL    11-03-20 @ 07:01  -  11-03-20 @ 12:26  --------------------------------------------------------  IN: 0 mL / OUT: 2200 mL / NET: -2200 mL      Physical Exam:  	Gen: NAD, cooperative  	Pulm: CTA B/L  	CV: S1S2, RRR  	Abd: Soft, +BS, NT  	Ext: 2+ LE edema B/L  	Neuro: Awake, A&O x3, moving al extremities   	Skin: Warm and dry             : +ve kim       LABS/STUDIES  --------------------------------------------------------------------------------              13.1   4.37  >-----------<  97       [11-03-20 @ 04:20]              39.7     143  |  116  |  89  ----------------------------<  113      [11-03-20 @ 04:20]  5.9   |  16  |  3.58        Ca     9.6     [11-03-20 @ 04:20]      Mg     2.6     [11-03-20 @ 04:20]      Phos  5.1     [11-03-20 @ 04:20]    TPro  6.9  /  Alb  3.8  /  TBili  0.3  /  DBili  x   /  AST  11  /  ALT  23  /  AlkPhos  43  [11-03-20 @ 04:20]          Creatinine Trend:  SCr 3.58 [11-03 @ 04:20]  SCr 4.51 [11-03 @ 01:15]  SCr 6.32 [11-02 @ 21:00]  SCr 7.56 [11-02 @ 19:05]    Urinalysis - [11-02-20 @ 19:05]      Color LIGHT YELLOW / Appearance CLEAR / SG 1.013 / pH 6.0      Gluc NEGATIVE / Ketone NEGATIVE  / Bili NEGATIVE / Urobili NORMAL       Blood MODERATE / Protein NEGATIVE / Leuk Est NEGATIVE / Nitrite NEGATIVE      RBC 11-25 / WBC 3-5 / Hyaline NEGATIVE / Gran  / Sq Epi OCC / Non Sq Epi  / Bacteria NEGATIVE    Urine Creatinine 42.90      [11-02-20 @ 23:20]  Urine Sodium 80      [11-02-20 @ 23:20]  Urine Potassium 20.1      [11-02-20 @ 23:20]    Iron 56, TIBC 183, %sat --      [11-03-20 @ 04:20]  Ferritin 639.1      [11-03-20 @ 04:20]       [facility] DIVISION OF KIDNEY DISEASES AND HYPERTENSION -- [phone]  -- INITIAL CONSULT NOTE  --------------------------------------------------------------------------------  If any questions, please feel free to contact me  NS pager: [phone], [facility]: [phone]  Eros Ryder M.D.  Nephrology Fellow    (After 5 pm or on weekends please page the on-call fellow)  --------------------------------------------------------------------------------    HPI:  65M with PMH HTN, depression, prostate adenocarcinoma s/p radiation therapy on presents to [facility] ED c/o b/l LE edema. Pt states he got 5 courses RT for prostate Ca between October-November. During this treatment he was found with urinary retention and had a kim placed, which was removed about 2 weeks ago. Pt endorses that since removing kim cath noted progression of LE edema. He also noted abdominal distention. Denies abdominal pain. He reports that is able to make urine and endorsed incontinence. also reports that was taking Aleve to "help with the inflammation" Denies fever, chills, chest pain, hematuria, SOB, vomiting, diarrhea. At ED found hyperkalemic (7.9) and sCr 7.5 - Kim was placed with 1L of Urine drained.     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 1000 milliLiter(s) IV Continuous <Continuous>  sodium zirconium cyclosilicate 10 Gram(s) Oral every 8 hours  tamsulosin 0.4 milliGRAM(s) Oral two times a day    PRN Inpatient Medications      REVIEW OF SYSTEMS  --------------------------------------------------------------------------------  Gen: No fevers/chills  Skin: No rashes  Respiratory: No dyspnea, cough  CV: No chest pain  GI: +ve abdominal distention-  No abdominal pain, diarrhea  : incontinence, No dysuria, hematuria  MSK: +ve worsening LE  edema    All other systems were reviewed and are negative, except as noted.    VITALS/PHYSICAL EXAM  --------------------------------------------------------------------------------  T(C): 36.4 (11-03-20 @ 05:00), Max: 37 (11-03-20 @ 01:52)  HR: 110 (11-03-20 @ 05:00) (104 - 120)  BP: 145/68 (11-03-20 @ 05:00) (132/80 - 209/93)  RR: 18 (11-03-20 @ 05:00) (16 - 20)  SpO2: 99% (11-03-20 @ 05:00) (98% - 100%)  Wt(kg): --        11-02-20 @ 07:01  -  11-03-20 @ 07:00  --------------------------------------------------------  IN: 0 mL / OUT: 6100 mL / NET: -6100 mL    11-03-20 @ 07:01  -  11-03-20 @ 12:26  --------------------------------------------------------  IN: 0 mL / OUT: 2200 mL / NET: -2200 mL      Physical Exam:  	Gen: NAD, cooperative  	Pulm: CTA B/L  	CV: S1S2, RRR  	Abd: Soft, +BS, NT  	Ext: 2+ LE edema B/L  	Neuro: Awake, A&O x3, moving al extremities   	Skin: Warm and dry             : +ve kim       LABS/STUDIES  --------------------------------------------------------------------------------              13.1   4.37  >-----------<  97       [11-03-20 @ 04:20]              39.7     143  |  116  |  89  ----------------------------<  113      [11-03-20 @ 04:20]  5.9   |  16  |  3.58        Ca     9.6     [11-03-20 @ 04:20]      Mg     2.6     [11-03-20 @ 04:20]      Phos  5.1     [11-03-20 @ 04:20]    TPro  6.9  /  Alb  3.8  /  TBili  0.3  /  DBili  x   /  AST  11  /  ALT  23  /  AlkPhos  43  [11-03-20 @ 04:20]          Creatinine Trend:  SCr 3.58 [11-03 @ 04:20]  SCr 4.51 [11-03 @ 01:15]  SCr 6.32 [11-02 @ 21:00]  SCr 7.56 [11-02 @ 19:05]    Urinalysis - [11-02-20 @ 19:05]      Color LIGHT YELLOW / Appearance CLEAR / SG 1.013 / pH 6.0      Gluc NEGATIVE / Ketone NEGATIVE  / Bili NEGATIVE / Urobili NORMAL       Blood MODERATE / Protein NEGATIVE / Leuk Est NEGATIVE / Nitrite NEGATIVE      RBC 11-25 / WBC 3-5 / Hyaline NEGATIVE / Gran  / Sq Epi OCC / Non Sq Epi  / Bacteria NEGATIVE    Urine Creatinine 42.90      [11-02-20 @ 23:20]  Urine Sodium 80      [11-02-20 @ 23:20]  Urine Potassium 20.1      [11-02-20 @ 23:20]    Iron 56, TIBC 183, %sat --      [11-03-20 @ 04:20]  Ferritin 639.1      [11-03-20 @ 04:20]

## 2020-11-03 NOTE — PROGRESS NOTE ADULT - PROBLEM SELECTOR PLAN 3
likely due to Likely 2/2 volume overload in setting on ARF  - Duplex neg. for DVT - likely due to Likely 2/2 volume overload in setting on ARF  - Duplex neg. for DVT - likely due to Likely 2/2 volume overload in setting on ARF  - Duplex neg. for DVT  - Erythema noted at LLE with warmth and tender to palpation- concern for cellulitis- started on Keflex.

## 2020-11-03 NOTE — PROGRESS NOTE ADULT - SUBJECTIVE AND OBJECTIVE BOX
65M with PMH HTN, depression, prostate adenocarcinoma s/p radiation therapy on presents to [facility] ED c/o b/l LE edema. Pt was diagnosed December 2019. Patient underwent prostate biopsy with Dr. Alexia Muñoz on 12/03/2019, pathology report demonstrated adenocarcinoma of the prostate with Garland score 3 + 4 =7 involving 5 % - 30% of the tissue. Perineural invasion was identified on right apex medial. Pt states he got 5 courses RT between October-November. His last treatment was 2 weeks ago. Pt endorses the progression of LE edema/ It has been worsening over the past couple days and has made it difficult for the patient to ambulate. He also notes abdominal distention. He makes urine, and even endorses incontinence.    In ED, given calcium gluconate, hydralazine, humulin, lisinopril, lokelma, 500cc NS   65M with PMH HTN, depression, prostate adenocarcinoma s/p radiation therapy on presents to [facility] ED c/o b/l LE edema. Pt was diagnosed 2019. Patient underwent prostate biopsy with Dr. Alexia Muñoz on 2019, pathology report demonstrated adenocarcinoma of the prostate. Patient was treated with radiation, no chemotx. Pt states he got 5 courses RT between October-November. His last treatment was 2 weeks ago.  Patient was noted to have urinary retention 3 weeks prior when a kim was placed, kim removed 2 weeks ago. Patient reports urinary incontinence and pain at left abdomin and reports abdominal distention. Pt endorses the progression of LE edema, reports tt has been worsening over the past couple days and has made it difficult for the patient to ambulate. Patient reports pain on ambulating. In ED patient given calcium gluconate, hydralazine, humulin, lisinopril, lokelma, 500cc S. Patient voided total  7.2 L since admission.     Allergies: Doxycycline.     ICU Vital Signs Last 24 Hrs  T(C): 36.4 (2020 05:00), Max: 37 (2020 01:52)  T(F): 97.6 (2020 05:00), Max: 98.6 (2020 01:52)  HR: 110 (2020 05:00) (104 - 120)  BP: 145/68 (2020 05:00) (132/80 - 209/93)  BP(mean): --  ABP: --  ABP(mean): --  RR: 18 (2020 05:00) (16 - 20)  SpO2: 99% (2020 05:00) (98% - 100%)    CBC Full  -  ( 2020 04:20 )  WBC Count : 4.37 K/uL  RBC Count : 4.46 M/uL  Hemoglobin : 13.1 g/dL  Hematocrit : 39.7 %  Platelet Count - Automated : 97 K/uL  Mean Cell Volume : 89.0 fL  Mean Cell Hemoglobin : 29.4 pg  Mean Cell Hemoglobin Concentration : 33.0 %  Auto Neutrophil # : x  Auto Lymphocyte # : x  Auto Monocyte # : x  Auto Eosinophil # : x  Auto Basophil # : x  Auto Neutrophil % : x  Auto Lymphocyte % : x  Auto Monocyte % : x  Auto Eosinophil % : x  Auto Basophil % : x                          13.1   4.37  )-----------( 97       ( 2020 04:20 )             39.7       11-    143  |  116<H>  |  89<H>  ----------------------------<  113<H>  5.9<H>   |  16<L>  |  3.58<H>    Ca    9.6      2020 04:20  Phos  5.1     -  Mg     2.6         TPro  6.9  /  Alb  3.8  /  TBili  0.3  /  DBili  x   /  AST  11  /  ALT  23  /  AlkPhos  43  11-            Urinalysis Basic - ( 2020 19:05 )    Color: LIGHT YELLOW / Appearance: CLEAR / S.013 / pH: 6.0  Gluc: NEGATIVE / Ketone: NEGATIVE  / Bili: NEGATIVE / Urobili: NORMAL   Blood: MODERATE / Protein: NEGATIVE / Nitrite: NEGATIVE   Leuk Esterase: NEGATIVE / RBC: 11-25 / WBC 3-5   Sq Epi: OCC / Non Sq Epi: x / Bacteria: NEGATIVE        CAPILLARY BLOOD GLUCOSE  POCT Blood Glucose.: 212 mg/dL (2020 08:49)                 65M with PMH HTN, depression, prostate adenocarcinoma s/p radiation therapy on presents to [facility] ED c/o b/l LE edema. Pt was diagnosed 2019. Patient underwent prostate biopsy with Dr. Alexia Muñoz on 2019, pathology report demonstrated adenocarcinoma of the prostate. Patient was treated with radiation, no chemotx. Pt states he got 5 courses RT between October-November. His last treatment was 2 weeks ago.  Patient was noted to have urinary retention 3 weeks prior when a kim was placed, kim removed 2 weeks ago. Patient reports urinary incontinence and pain at left abdomin and reports abdominal distention. Pt endorses the progression of LE edema, reports tt has been worsening over the past couple days and has made it difficult for the patient to ambulate. Patient reports pain on ambulating. In ED patient given calcium gluconate, hydralazine, humulin, lisinopril, lokelma, 500cc S. Patient voided total  7.2 L since admission. B/L LE duplex US: neg DVT, Urine analysis :+RBC. Urine Cx: no growth.     Allergies: Doxycycline.     ICU Vital Signs Last 24 Hrs  T(C): 36.4 (2020 05:00), Max: 37 (2020 01:52)  T(F): 97.6 (2020 05:00), Max: 98.6 (2020 01:52)  HR: 110 (2020 05:00) (104 - 120)  BP: 145/68 (2020 05:00) (132/80 - 209/93)  BP(mean): --  ABP: --  ABP(mean): --  RR: 18 (2020 05:00) (16 - 20)  SpO2: 99% (2020 05:00) (98% - 100%)    CBC Full  -  ( 2020 04:20 )  WBC Count : 4.37 K/uL  RBC Count : 4.46 M/uL  Hemoglobin : 13.1 g/dL  Hematocrit : 39.7 %  Platelet Count - Automated : 97 K/uL  Mean Cell Volume : 89.0 fL  Mean Cell Hemoglobin : 29.4 pg  Mean Cell Hemoglobin Concentration : 33.0 %  Auto Neutrophil # : x  Auto Lymphocyte # : x  Auto Monocyte # : x  Auto Eosinophil # : x  Auto Basophil # : x  Auto Neutrophil % : x  Auto Lymphocyte % : x  Auto Monocyte % : x  Auto Eosinophil % : x  Auto Basophil % : x                          13.1   4.37  )-----------( 97       ( 2020 04:20 )             39.7       11-03    143  |  116<H>  |  89<H>  ----------------------------<  113<H>  5.9<H>   |  16<L>  |  3.58<H>    Ca    9.6      2020 04:20  Phos  5.1     11-  Mg     2.6         TPro  6.9  /  Alb  3.8  /  TBili  0.3  /  DBili  x   /  AST  11  /  ALT  23  /  AlkPhos  43              Urinalysis Basic - ( 2020 19:05 )    Color: LIGHT YELLOW / Appearance: CLEAR / S.013 / pH: 6.0  Gluc: NEGATIVE / Ketone: NEGATIVE  / Bili: NEGATIVE / Urobili: NORMAL   Blood: MODERATE / Protein: NEGATIVE / Nitrite: NEGATIVE   Leuk Esterase: NEGATIVE / RBC: 11-25 / WBC 3-5   Sq Epi: OCC / Non Sq Epi: x / Bacteria: NEGATIVE    CAPILLARY BLOOD GLUCOSE  POCT Blood Glucose.: 212 mg/dL (2020 08:49)        Physical Exam:   GENERAL: NAD, lying in bed comfortably  HEAD:  Atraumatic, Normocephalic  EYES: EOMI, PERRLA, conjunctiva and sclera clear  ENT: Moist mucous membranes  NECK: Supple, No JVD. s/p cervical laminectomy, scar visible on midline  CHEST/LUNG: Clear to auscultation bilaterally; No rales, rhonchi, wheezing, or rubs. Unlabored respirations, no crackles.   HEART: Regular rate and rhythm; No murmurs, rubs, or gallops  ABDOMEN: Bowel sounds present; Soft, Nontender, + distended. + hepatomegaly  EXTREMITIES:  2+ Peripheral Pulses, brisk capillary refill. 2+ pitting edema to knees b/l   NERVOUS SYSTEM:  Alert & Oriented X3, speech clear. No deficits   MSK: FROM all 4 extremities, full and equal strength  SKIN: No rashes or lesions               Yudi Pichardo MD  [facility] Division of Hospital Medicine  Pager [phone] (M-F 8AM-5PM)  Other Times: [id number]    Patient is a 65y old  Male who presents with a chief complaint of b/l LE edema (2020 12:25)    SUBJECTIVE / OVERNIGHT EVENTS: No events overnight, patient with good UOP, denies shortness of breath, orthopnea     MEDICATIONS  (STANDING):  finasteride 5 milliGRAM(s) Oral daily  heparin   Injectable 5000 Unit(s) SubCutaneous every 8 hours  oxybutynin 5 milliGRAM(s) Oral daily  sodium chloride 0.45%. 1000 milliLiter(s) (100 mL/Hr) IV Continuous <Continuous>  tamsulosin 0.4 milliGRAM(s) Oral two times a day    MEDICATIONS  (PRN):      PHYSICAL EXAM:  Vital Signs Last 24 Hrs  T(C): 36.4 (2020 05:00), Max: 37 (2020 01:52)  T(F): 97.6 (2020 05:00), Max: 98.6 (2020 01:52)  HR: 110 (2020 05:00) (104 - 120)  BP: 145/68 (2020 05:00) (132/80 - 209/93)  RR: 18 (2020 05:00) (16 - 20)  SpO2: 99% (2020 05:00) (98% - 100%)    CONSTITUTIONAL: NAD, well-developed, well-groomed  RESPIRATORY: Normal respiratory effort; lungs are clear to auscultation bilaterally  CARDIOVASCULAR: Regular rate and rhythm, normal S1 and S2, no murmur/rub/gallop; 2+ LE edema extending to thighs  GASTROINTESTINAL: Nontender to palpation, normoactive bowel sounds, no rebound/guarding; No hepatosplenomegaly  MUSCULOSKELETAL:  no clubbing or cyanosis of digits; no joint swelling or tenderness to palpation  NEUROLOGY: non-focal; no gross sensory deficits   PSYCH: A+O to person, place, and time; affect appropriate  SKIN: No rashes; warm     LABS:                        13.1   4.37  )-----------( 97       ( 2020 04:20 )             39.7     11-03    146<H>  |  117<H>  |  64<H>  ----------------------------<  126<H>  5.3   |  21<L>  |  2.21<H>    Ca    9.5      2020 11:48  Phos  4.1       Mg     2.4         TPro  6.9  /  Alb  3.8  /  TBili  0.3  /  DBili  x   /  AST  11  /  ALT  23  /  AlkPhos  43      Urinalysis Basic - ( 2020 19:05 )    Color: LIGHT YELLOW / Appearance: CLEAR / S.013 / pH: 6.0  Gluc: NEGATIVE / Ketone: NEGATIVE  / Bili: NEGATIVE / Urobili: NORMAL   Blood: MODERATE / Protein: NEGATIVE / Nitrite: NEGATIVE   Leuk Esterase: NEGATIVE / RBC: 11-25 / WBC 3-5   Sq Epi: OCC / Non Sq Epi: x / Bacteria: NEGATIVE    RADIOLOGY & ADDITIONAL TESTS:  Results Reviewed:   Imaging Personally Reviewed:  Electrocardiogram Personally Reviewed:    COORDINATION OF CARE:  Care Discussed with Consultants/Other Providers [Y/N]:  Prior or Outpatient Records Reviewed [Y/N]:   Patient is a 65y old  Male who presents with a chief complaint of b/l LE edema (2020 12:25)    SUBJECTIVE / OVERNIGHT EVENTS: No events overnight, patient with good UOP, denies shortness of breath, orthopnea     MEDICATIONS  (STANDING):  finasteride 5 milliGRAM(s) Oral daily  heparin   Injectable 5000 Unit(s) SubCutaneous every 8 hours  oxybutynin 5 milliGRAM(s) Oral daily  sodium chloride 0.45%. 1000 milliLiter(s) (100 mL/Hr) IV Continuous <Continuous>  tamsulosin 0.4 milliGRAM(s) Oral two times a day    MEDICATIONS  (PRN):      PHYSICAL EXAM:  Vital Signs Last 24 Hrs  T(C): 36.4 (2020 05:00), Max: 37 (2020 01:52)  T(F): 97.6 (2020 05:00), Max: 98.6 (2020 01:52)  HR: 110 (2020 05:00) (104 - 120)  BP: 145/68 (2020 05:00) (132/80 - 209/93)  RR: 18 (2020 05:00) (16 - 20)  SpO2: 99% (2020 05:00) (98% - 100%)    CONSTITUTIONAL: NAD, well-developed, well-groomed  RESPIRATORY: Normal respiratory effort; lungs are clear to auscultation bilaterally  CARDIOVASCULAR: Regular rate and rhythm, normal S1 and S2, no murmur/rub/gallop; 2+ LE edema extending to thighs  GASTROINTESTINAL: Nontender to palpation, normoactive bowel sounds, no rebound/guarding; No hepatosplenomegaly  MUSCULOSKELETAL:  no clubbing or cyanosis of digits; no joint swelling or tenderness to palpation  NEUROLOGY: non-focal; no gross sensory deficits   PSYCH: A+O to person, place, and time; affect appropriate  SKIN: No rashes; warm     LABS:                        13.1   4.37  )-----------( 97       ( 2020 04:20 )             39.7     11-03    146<H>  |  117<H>  |  64<H>  ----------------------------<  126<H>  5.3   |  21<L>  |  2.21<H>    Ca    9.5      2020 11:48  Phos  4.1     -  Mg     2.4         TPro  6.9  /  Alb  3.8  /  TBili  0.3  /  DBili  x   /  AST  11  /  ALT  23  /  AlkPhos  43  -    Urinalysis Basic - ( 2020 19:05 )    Color: LIGHT YELLOW / Appearance: CLEAR / S.013 / pH: 6.0  Gluc: NEGATIVE / Ketone: NEGATIVE  / Bili: NEGATIVE / Urobili: NORMAL   Blood: MODERATE / Protein: NEGATIVE / Nitrite: NEGATIVE   Leuk Esterase: NEGATIVE / RBC: 11-25 / WBC 3-5   Sq Epi: OCC / Non Sq Epi: x / Bacteria: NEGATIVE    RADIOLOGY & ADDITIONAL TESTS:  Results Reviewed:   Imaging Personally Reviewed:  Electrocardiogram Personally Reviewed:    COORDINATION OF CARE:  Care Discussed with Consultants/Other Providers [Y/N]:  Prior or Outpatient Records Reviewed [Y/N]:   Patient is a 65y old  Male who presents with a chief complaint of b/l LE edema (2020 12:25)    SUBJECTIVE / OVERNIGHT EVENTS: No events overnight, patient with good UOP, denies shortness of breath, orthopnea. Left LE erythema and warmth noted - concerning for cellulitis - started pt on Keflex.     MEDICATIONS  (STANDING):  finasteride 5 milliGRAM(s) Oral daily  heparin   Injectable 5000 Unit(s) SubCutaneous every 8 hours  oxybutynin 5 milliGRAM(s) Oral daily  sodium chloride 0.45%. 1000 milliLiter(s) (100 mL/Hr) IV Continuous <Continuous>  tamsulosin 0.4 milliGRAM(s) Oral two times a day    MEDICATIONS  (PRN):      PHYSICAL EXAM:  Vital Signs Last 24 Hrs  T(C): 36.4 (2020 05:00), Max: 37 (2020 01:52)  T(F): 97.6 (2020 05:00), Max: 98.6 (2020 01:52)  HR: 110 (2020 05:00) (104 - 120)  BP: 145/68 (2020 05:00) (132/80 - 209/93)  RR: 18 (2020 05:00) (16 - 20)  SpO2: 99% (2020 05:00) (98% - 100%)    CONSTITUTIONAL: NAD, well-developed, well-groomed  RESPIRATORY: Normal respiratory effort; lungs are clear to auscultation bilaterally  CARDIOVASCULAR: Regular rate and rhythm, normal S1 and S2, no murmur/rub/gallop; 2+ LE edema extending to thighs  GASTROINTESTINAL: Nontender to palpation, normoactive bowel sounds, no rebound/guarding; No hepatosplenomegaly  MUSCULOSKELETAL:  no clubbing or cyanosis of digits; no joint swelling. 2+ LE edema extending to thighs, LLE erythema and warm and tender to palpation vs. RLE.   NEUROLOGY: non-focal; no gross sensory deficits   PSYCH: A+O to person, place, and time; affect appropriate  SKIN: No rashes; warm     LABS:                        13.1   4.37  )-----------( 97       ( 2020 04:20 )             39.7     11-03    146<H>  |  117<H>  |  64<H>  ----------------------------<  126<H>  5.3   |  21<L>  |  2.21<H>    Ca    9.5      2020 11:48  Phos  4.1       Mg     2.4         TPro  6.9  /  Alb  3.8  /  TBili  0.3  /  DBili  x   /  AST  11  /  ALT  23  /  AlkPhos  43      Urinalysis Basic - ( 2020 19:05 )    Color: LIGHT YELLOW / Appearance: CLEAR / S.013 / pH: 6.0  Gluc: NEGATIVE / Ketone: NEGATIVE  / Bili: NEGATIVE / Urobili: NORMAL   Blood: MODERATE / Protein: NEGATIVE / Nitrite: NEGATIVE   Leuk Esterase: NEGATIVE / RBC: 11-25 / WBC 3-5   Sq Epi: OCC / Non Sq Epi: x / Bacteria: NEGATIVE    RADIOLOGY & ADDITIONAL TESTS:  Results Reviewed:   Imaging Personally Reviewed:  Electrocardiogram Personally Reviewed:    COORDINATION OF CARE:  Care Discussed with Consultants/Other Providers [Y/N]:  Prior or Outpatient Records Reviewed [Y/N]:   Patient is a 65y old  Male who presents with a chief complaint of b/l LE edema (2020 12:25)    SUBJECTIVE / OVERNIGHT EVENTS: No events overnight, patient with good UOP, denies shortness of breath, orthopnea.     MEDICATIONS  (STANDING):  finasteride 5 milliGRAM(s) Oral daily  heparin   Injectable 5000 Unit(s) SubCutaneous every 8 hours  oxybutynin 5 milliGRAM(s) Oral daily  sodium chloride 0.45%. 1000 milliLiter(s) (100 mL/Hr) IV Continuous <Continuous>  tamsulosin 0.4 milliGRAM(s) Oral two times a day    MEDICATIONS  (PRN):      PHYSICAL EXAM:  Vital Signs Last 24 Hrs  T(C): 36.4 (2020 05:00), Max: 37 (2020 01:52)  T(F): 97.6 (2020 05:00), Max: 98.6 (2020 01:52)  HR: 110 (2020 05:00) (104 - 120)  BP: 145/68 (2020 05:00) (132/80 - 209/93)  RR: 18 (2020 05:00) (16 - 20)  SpO2: 99% (2020 05:00) (98% - 100%)    CONSTITUTIONAL: NAD, well-developed, well-groomed  RESPIRATORY: Normal respiratory effort; lungs are clear to auscultation bilaterally  CARDIOVASCULAR: Regular rate and rhythm, normal S1 and S2, no murmur/rub/gallop; 2+ LE edema extending to thighs  GASTROINTESTINAL: Nontender to palpation, normoactive bowel sounds, no rebound/guarding; No hepatosplenomegaly  MUSCULOSKELETAL:  no clubbing or cyanosis of digits; no joint swelling. 2+ LE edema extending to thighs, LLE erythema and warm and tender to palpation vs. RLE.   NEUROLOGY: non-focal; no gross sensory deficits   PSYCH: A+O to person, place, and time; affect appropriate  SKIN: No rashes; warm     LABS:                        13.1   4.37  )-----------( 97       ( 2020 04:20 )             39.7     11-03    146<H>  |  117<H>  |  64<H>  ----------------------------<  126<H>  5.3   |  21<L>  |  2.21<H>    Ca    9.5      2020 11:48  Phos  4.1     -  Mg     2.4         TPro  6.9  /  Alb  3.8  /  TBili  0.3  /  DBili  x   /  AST  11  /  ALT  23  /  AlkPhos  43  -    Urinalysis Basic - ( 2020 19:05 )    Color: LIGHT YELLOW / Appearance: CLEAR / S.013 / pH: 6.0  Gluc: NEGATIVE / Ketone: NEGATIVE  / Bili: NEGATIVE / Urobili: NORMAL   Blood: MODERATE / Protein: NEGATIVE / Nitrite: NEGATIVE   Leuk Esterase: NEGATIVE / RBC: 11-25 / WBC 3-5   Sq Epi: OCC / Non Sq Epi: x / Bacteria: NEGATIVE    RADIOLOGY & ADDITIONAL TESTS:  Results Reviewed:   Imaging Personally Reviewed:  Electrocardiogram Personally Reviewed:    COORDINATION OF CARE:  Care Discussed with Consultants/Other Providers [Y/N]:  Prior or Outpatient Records Reviewed [Y/N]:   Patient is a 65y old  Male who presents with a chief complaint of b/l LE edema (2020 12:25)    SUBJECTIVE / OVERNIGHT EVENTS: No events overnight, patient with good UOP, denies shortness of breath, orthopnea.     MEDICATIONS  (STANDING):  finasteride 5 milliGRAM(s) Oral daily  heparin   Injectable 5000 Unit(s) SubCutaneous every 8 hours  oxybutynin 5 milliGRAM(s) Oral daily  sodium chloride 0.45%. 1000 milliLiter(s) (100 mL/Hr) IV Continuous <Continuous>  tamsulosin 0.4 milliGRAM(s) Oral two times a day    MEDICATIONS  (PRN):      PHYSICAL EXAM:  Vital Signs Last 24 Hrs  T(C): 36.4 (2020 05:00), Max: 37 (2020 01:52)  T(F): 97.6 (2020 05:00), Max: 98.6 (2020 01:52)  HR: 110 (2020 05:00) (104 - 120)  BP: 145/68 (2020 05:00) (132/80 - 209/93)  RR: 18 (2020 05:00) (16 - 20)  SpO2: 99% (2020 05:00) (98% - 100%)    CONSTITUTIONAL: NAD, well-developed, well-groomed  RESPIRATORY: Normal respiratory effort; lungs are clear to auscultation bilaterally  CARDIOVASCULAR: Regular rate and rhythm, normal S1 and S2, no murmur/rub/gallop; 2+ LE edema extending to thighs  GASTROINTESTINAL: Nontender to palpation, normoactive bowel sounds, no rebound/guarding; No hepatosplenomegaly  MUSCULOSKELETAL:  no clubbing or cyanosis of digits; no joint swelling. 2+ LE edema extending to thighs,   NEUROLOGY: non-focal; no gross sensory deficits   PSYCH: A+O to person, place, and time; affect appropriate  SKIN: No rashes; warm     LABS:                        13.1   4.37  )-----------( 97       ( 2020 04:20 )             39.7     11-03    146<H>  |  117<H>  |  64<H>  ----------------------------<  126<H>  5.3   |  21<L>  |  2.21<H>    Ca    9.5      2020 11:48  Phos  4.1     -  Mg     2.4         TPro  6.9  /  Alb  3.8  /  TBili  0.3  /  DBili  x   /  AST  11  /  ALT  23  /  AlkPhos  43  -    Urinalysis Basic - ( 2020 19:05 )    Color: LIGHT YELLOW / Appearance: CLEAR / S.013 / pH: 6.0  Gluc: NEGATIVE / Ketone: NEGATIVE  / Bili: NEGATIVE / Urobili: NORMAL   Blood: MODERATE / Protein: NEGATIVE / Nitrite: NEGATIVE   Leuk Esterase: NEGATIVE / RBC: 11-25 / WBC 3-5   Sq Epi: OCC / Non Sq Epi: x / Bacteria: NEGATIVE    RADIOLOGY & ADDITIONAL TESTS:  Results Reviewed:   Imaging Personally Reviewed:  Electrocardiogram Personally Reviewed:    COORDINATION OF CARE:  Care Discussed with Consultants/Other Providers [Y/N]:  Prior or Outpatient Records Reviewed [Y/N]:

## 2020-11-03 NOTE — H&P ADULT - PROBLEM SELECTOR PLAN 5
- Outpatient f/u with Dr. Owusu  - c/w flomax, finasteride, oxybutynin - Bx: adenocarcinoma of the prostate with Andre score 3 + 4 =7 involving 5 % - 30% of the tissue  - TNM Stage: c T 1c N 0 M 0   - Outpatient f/u with Dr. Owusu  - c/w flomax, finasteride, oxybutynin - Bx: adenocarcinoma of the prostate with Ellerbe score 3 + 4 =7 involving 5 % - 30% of the tissue  - TNM Stage: c T 1c N 0 M 0   - Outpatient f/u with Dr. Owusu, Dr. Puri. Need to be contacted to   - c/w flomax, finasteride, oxybutynin

## 2020-11-03 NOTE — H&P ADULT - NSHPLABSRESULTS_GEN_ALL_CORE
12.1   4.61  )-----------( 81       ( 2020 19:05 )             37.0       11-02    140  |  109<H>  |  125<H>  ----------------------------<  107<H>  6.8<HH>   |  16<L>  |  6.32<H>    Ca    9.3      2020 21:00  Phos  5.6     11-  Mg     2.9         TPro  6.9  /  Alb  3.7  /  TBili  0.2  /  DBili  x   /  AST  14  /  ALT  21  /  AlkPhos  40  11-02              Urinalysis Basic - ( 2020 19:05 )    Color: LIGHT YELLOW / Appearance: CLEAR / S.013 / pH: 6.0  Gluc: NEGATIVE / Ketone: NEGATIVE  / Bili: NEGATIVE / Urobili: NORMAL   Blood: MODERATE / Protein: NEGATIVE / Nitrite: NEGATIVE   Leuk Esterase: NEGATIVE / RBC: 11-25 / WBC 3-5   Sq Epi: OCC / Non Sq Epi: x / Bacteria: NEGATIVE            Lactate Trend            CAPILLARY BLOOD GLUCOSE      POCT Blood Glucose.: 124 mg/dL (2020 00:25)

## 2020-11-03 NOTE — PROGRESS NOTE ADULT - PROBLEM SELECTOR PLAN 1
Crt on presentation 7.56 --> 3.58 this morning.     presented with LE edema, found to have Crt 7.56 (baseline is 0.9-1), , K 7.9  - s/p calcium gluconate and lokelma with improvement in K to 6.0. 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 Crt on presentation 7.56 --> 3.58 this morning.    --> 89  K 7.9 -> 5.9  - Continue calcium gluconate and lokelma   - Continue Lokelma 10 q8h  - ARF likely 2/2 post-renal obstruction given urinary output ~7.2L  - Avoid nephrotoxic agents, monitor I/Os - Crt on presentation 7.56 --> 3.58 this morning.   - Hyperkalemia resolved, s/p ca gluconate and lokelma   - ARF likely 2/2 post-renal obstruction given urinary output ~7.2L  - Avoid nephrotoxic agents, monitor I/Os

## 2020-11-03 NOTE — CONSULT NOTE ADULT - ATTENDING COMMENTS
pt is a 66 yo male with hx htn, prostate ca onradiation who presents  with increased swelling of lower ext, elevated creatinine 6.3 and k 6.8,  pt noted to have distended bladder , kim catheter placed with urine  output of 1.3 liters. Pt alert and oriented, bp 140/70 rr 18 lungs   clear heart s1s2 ext 2+ edema b/l,    -66 yo male with obstructive renal failure, likely due to inflammation  from prostate radiation.  suggest hyperkalemia cocktail , along with  kim drainage, monitor urine output, telemetry, monitor lytes,  pt does  not require icu level care, please reconsult if condition changes.
If concern for post obstructive diuresis would continue 1/2 NS with 0.5 to 1 / hr replacement for example if patient made 100 cc / hr give 50.

## 2020-11-03 NOTE — PROGRESS NOTE ADULT - SUBJECTIVE AND OBJECTIVE BOX
65M with PMH HTN, depression, prostate adenocarcinoma s/p radiation therapy on presents to [facility] ED c/o b/l LE edema. Pt was diagnosed December 2019. Patient underwent prostate biopsy with Dr. Alexia Muñoz on 12/03/2019, pathology report demonstrated adenocarcinoma of the prostate. Patient was treated with radiation, no chemotx. Pt states he got 5 courses RT between October-November. His last treatment was 2 weeks ago.  Patient was noted to have urinary retention 3 weeks prior when a kim was placed, kim removed 2 weeks ago. Patient reports urinary incontinence and pain at left abdomin and reports abdominal distention. Pt endorses the progression of LE edema, reports tt has been worsening over the past couple days and has made it difficult for the patient to ambulate. Patient reports pain on ambulating. In ED patient given calcium gluconate, hydralazine, humulin, lisinopril, lokelma, 500cc S. Patient voided total  7.2 L since admission.     Allergies: Doxycycline.

## 2020-11-03 NOTE — PROGRESS NOTE ADULT - PROBLEM SELECTOR PLAN 5
Bx: adenocarcinoma of the prostate with Andre score 3 + 4 =7 involving 5 % - 30% of the tissue.  - TNM Stage: c T 1c N 0 M 0   - Outpatient f/u with Dr. Owusu, Dr. Puri. Need to be contacted for recs.  - c/w flomax, finasteride, oxybutynin.

## 2020-11-03 NOTE — SBIRT NOTE ADULT - NSSBIRTALCPOSREINDET_GEN_A_CORE
Provided SBIRT services: Full screen Positive. Positive reinforcement provided given patient currently within healthy guidelines. Education materials reviewed and given to patient.

## 2020-11-03 NOTE — H&P ADULT - ATTENDING COMMENTS
65M with PMH HTN, depression, prostate adenocarcinoma s/p radiation therapy on presents to [facility] in setting of recent incontinence, abd and BL leg swelling 2/2 AUR complicated by severe hyperK, and DAYSI. Maldonado placed and has thus far diuresed 6L. Once BP better controlled, can began replacing fluid lost from post-obstructive diuresis (1/2 the fluid lost with 0.45NS). Received insulin; c/w lokelma for now. Initial ekg when K 7.9 with no peaked t waves or widened qrs. Distinct p waves, normal qtc. C/w tele, await formal renal reccs. Would obtain input from Urology (Dr Thong Iqbal) as well as Rad Onc (Dr Max Owusu) 65M with PMH HTN, depression, prostate adenocarcinoma s/p radiation therapy on presents to [facility] in setting of recent incontinence, abd and BL leg swelling 2/2 AUR complicated by severe hyperK, and DAYSI. Maldonado placed and has thus far diuresed 6L. Once BP better controlled, can began replacing fluid lost from post-obstructive diuresis (1/2 the fluid lost with 0.45NS). C/w tamsulosin and finasteride.  Received insulin; c/w lokelma for now. Initial ekg when K 7.9 with no peaked t waves or widened qrs. Distinct p waves, normal qtc. C/w tele, await formal renal reccs. Would obtain input from Urology (Dr Thong Iqbal) as well as Rad Onc (Dr Max Owusu) 65M with PMH HTN, depression, prostate adenocarcinoma s/p radiation therapy presents to [facility] in setting of recent incontinence, abd and BL leg swelling 2/2 AUR complicated by severe hyperK, and DAYSI. Maldonado placed and has thus far diuresed 6L. Once BP better controlled, can began replacing fluid lost from post-obstructive diuresis (1/2 the fluid lost with 0.45NS). C/w tamsulosin and finasteride.  Received insulin; c/w lokelma for now. Initial ekg when K 7.9 with no peaked t waves or widened qrs. Distinct p waves, normal qtc. C/w tele, await formal renal reccs. Would obtain input from Urology (Dr Thong Iqbal) as well as Rad Onc (Dr Max Owusu)

## 2020-11-03 NOTE — H&P ADULT - PROBLEM SELECTOR PLAN 1
- Found presented with LE edema, found to have Crt 7.56 (baseline is 0.9-1), , K 7.9  - s/p calcium gluconate and lokelma with improvement in K to 6.0  - ARF likely 2/2 post-renal obstruction given output of 5.1L  - Nephro consulted: no role for HD at this time, consult to follow in am  - Avoid nephrotoxic agents, monitor I/Os  - MICU consulted: obstructive renal failure, likely due to inflammation  from prostate radiation, not a MICU candidate at this time - Found presented with LE edema, found to have Crt 7.56 (baseline is 0.9-1), , K 7.9  - s/p calcium gluconate and lokelma with improvement in K to 6.0. Start Lokelma 10 q8h  - ARF likely 2/2 post-renal obstruction given output of 5.1L  - Nephro consulted: no role for HD at this time, consult to follow in am  - Avoid nephrotoxic agents, monitor I/Os  - MICU consulted: obstructive renal failure, likely due to inflammation  from prostate radiation, not a MICU candidate at this time

## 2020-11-03 NOTE — PROGRESS NOTE ADULT - ASSESSMENT
65M with PMH HTN, depression, prostate adenocarcinoma s/p radiation therapy with acute renal failure likely 2/2 obstructive uropathy with b/l LE edema.     on presents to the hospital with bilateral LE edema, found to be in acute renal failure, likely 2/2 urinary obstruction in setting of prostate cancer radiation.      Problem/Plan - 1:  ·  Problem: Acute renal failure with LE edema, Crt     Plan: - Found presented with LE edema, found to have Crt 7.56 (baseline is 0.9-1), , K 7.9  - s/p calcium gluconate and lokelma with improvement in K to 6.0. 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      Problem/Plan - 2:  ·  Problem: Urinary obstruction.  Plan: - Likely 2/2 prostate radiation  - Maldonado in place, drained about 5L  - Monitor I/Os  - Plan as above.      Problem/Plan - 3:  ·  Problem: Leg swelling.  Plan: - Likely 2/2 volume overload in setting on ARF  - Duplex neg.      Problem/Plan - 4:  ·  Problem: Hypertension, unspecified type.  Plan: - hold home lisinopril given elevated ARF.      Problem/Plan - 5:  ·  Problem: Prostate cancer.  Plan: - Bx: adenocarcinoma of the prostate with Andre score 3 + 4 =7 involving 5 % - 30% of the tissue  - TNM Stage: c T 1c N 0 M 0   - Outpatient f/u with Dr. Owusu, Dr. Puri. Need to be contacted to   - c/w flomax, finasteride, oxybutynin.      Problem/Plan - 6:  Problem: Medication management. Plan: - due to Cr clearance, held cialis, cymbalta, lisinopril, oxybutynin, gabapentin.     Problem/Plan - 7:  ·  Problem: Prophylactic measure.  Plan: - dvt ppx: hep.   65M with PMH HTN, depression, prostate adenocarcinoma s/p radiation therapy with acute renal failure likely 2/2 obstructive uropathy in setting of prostate radiation therapy with b/l LE edema.      Problem/Plan - 1:  ·  Problem: Acute renal failure with LE edema, Crt     Plan: - Found presented with LE edema, found to have Crt 7.56 (baseline is 0.9-1), , K 7.9  - s/p calcium gluconate and lokelma with improvement in K to 6.0. 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      Problem/Plan - 2:  ·  Problem: Urinary obstruction.  Plan: - Likely 2/2 prostate radiation  - Maldonado in place, drained about 5L  - Monitor I/Os  - Plan as above.      Problem/Plan - 3:  ·  Problem: Leg swelling.  Plan: - Likely 2/2 volume overload in setting on ARF  - Duplex neg.      Problem/Plan - 4:  ·  Problem: Hypertension, unspecified type.  Plan: - hold home lisinopril given elevated ARF.      Problem/Plan - 5:  ·  Problem: Prostate cancer.  Plan: - Bx: adenocarcinoma of the prostate with [place] score 3 + 4 =7 involving 5 % - 30% of the tissue  - TNM Stage: c T 1c N 0 M 0   - Outpatient f/u with Dr. Owusu, Dr. Puri. Need to be contacted to   - c/w flomax, finasteride, oxybutynin.      Problem/Plan - 6:  Problem: Medication management. Plan: - due to Cr clearance, held cialis, cymbalta, lisinopril, oxybutynin, gabapentin.     Problem/Plan - 7:  ·  Problem: Prophylactic measure.  Plan: - dvt ppx: hep. 65M with PMH HTN, depression, prostate adenocarcinoma s/p radiation therapy with acute renal failure likely 2/2 obstructive uropathy in setting of prostate radiation therapy with b/l LE edema.

## 2020-11-04 LAB
ANION GAP SERPL CALC-SCNC: 7 MMO/L — SIGNIFICANT CHANGE UP (ref 7–14)
ANION GAP SERPL CALC-SCNC: 9 MMO/L — SIGNIFICANT CHANGE UP (ref 7–14)
BUN SERPL-MCNC: 28 MG/DL — HIGH (ref 7–23)
BUN SERPL-MCNC: 36 MG/DL — HIGH (ref 7–23)
CALCIUM SERPL-MCNC: 8.6 MG/DL — SIGNIFICANT CHANGE UP (ref 8.4–10.5)
CALCIUM SERPL-MCNC: 8.9 MG/DL — SIGNIFICANT CHANGE UP (ref 8.4–10.5)
CHLORIDE SERPL-SCNC: 113 MMOL/L — HIGH (ref 98–107)
CHLORIDE SERPL-SCNC: 113 MMOL/L — HIGH (ref 98–107)
CO2 SERPL-SCNC: 22 MMOL/L — SIGNIFICANT CHANGE UP (ref 22–31)
CO2 SERPL-SCNC: 26 MMOL/L — SIGNIFICANT CHANGE UP (ref 22–31)
CREAT SERPL-MCNC: 1.11 MG/DL — SIGNIFICANT CHANGE UP (ref 0.5–1.3)
CREAT SERPL-MCNC: 1.26 MG/DL — SIGNIFICANT CHANGE UP (ref 0.5–1.3)
GLUCOSE SERPL-MCNC: 102 MG/DL — HIGH (ref 70–99)
GLUCOSE SERPL-MCNC: 111 MG/DL — HIGH (ref 70–99)
HCT VFR BLD CALC: 37.4 % — LOW (ref 39–50)
HCV AB S/CO SERPL IA: 0.08 S/CO — SIGNIFICANT CHANGE UP (ref 0–0.99)
HCV AB SERPL-IMP: SIGNIFICANT CHANGE UP
HGB BLD-MCNC: 11.7 G/DL — LOW (ref 13–17)
MAGNESIUM SERPL-MCNC: 1.9 MG/DL — SIGNIFICANT CHANGE UP (ref 1.6–2.6)
MAGNESIUM SERPL-MCNC: 2 MG/DL — SIGNIFICANT CHANGE UP (ref 1.6–2.6)
MCHC RBC-ENTMCNC: 29.5 PG — SIGNIFICANT CHANGE UP (ref 27–34)
MCHC RBC-ENTMCNC: 31.3 % — LOW (ref 32–36)
MCV RBC AUTO: 94.4 FL — SIGNIFICANT CHANGE UP (ref 80–100)
NRBC # FLD: 0 K/UL — SIGNIFICANT CHANGE UP (ref 0–0)
PHOSPHATE SERPL-MCNC: 2.6 MG/DL — SIGNIFICANT CHANGE UP (ref 2.5–4.5)
PHOSPHATE SERPL-MCNC: 3 MG/DL — SIGNIFICANT CHANGE UP (ref 2.5–4.5)
PLATELET # BLD AUTO: 96 K/UL — LOW (ref 150–400)
PMV BLD: 10.3 FL — SIGNIFICANT CHANGE UP (ref 7–13)
POTASSIUM SERPL-MCNC: 4.7 MMOL/L — SIGNIFICANT CHANGE UP (ref 3.5–5.3)
POTASSIUM SERPL-MCNC: 4.9 MMOL/L — SIGNIFICANT CHANGE UP (ref 3.5–5.3)
POTASSIUM SERPL-SCNC: 4.7 MMOL/L — SIGNIFICANT CHANGE UP (ref 3.5–5.3)
POTASSIUM SERPL-SCNC: 4.9 MMOL/L — SIGNIFICANT CHANGE UP (ref 3.5–5.3)
RBC # BLD: 3.96 M/UL — LOW (ref 4.2–5.8)
RBC # FLD: 13.8 % — SIGNIFICANT CHANGE UP (ref 10.3–14.5)
SODIUM SERPL-SCNC: 144 MMOL/L — SIGNIFICANT CHANGE UP (ref 135–145)
SODIUM SERPL-SCNC: 146 MMOL/L — HIGH (ref 135–145)
WBC # BLD: 3.65 K/UL — LOW (ref 3.8–10.5)
WBC # FLD AUTO: 3.65 K/UL — LOW (ref 3.8–10.5)

## 2020-11-04 PROCEDURE — 99233 SBSQ HOSP IP/OBS HIGH 50: CPT | Mod: GC

## 2020-11-04 PROCEDURE — 99233 SBSQ HOSP IP/OBS HIGH 50: CPT

## 2020-11-04 RX ORDER — LANOLIN ALCOHOL/MO/W.PET/CERES
3 CREAM (GRAM) TOPICAL AT BEDTIME
Refills: 0 | Status: DISCONTINUED | OUTPATIENT
Start: 2020-11-04 | End: 2020-11-05

## 2020-11-04 RX ORDER — POLYETHYLENE GLYCOL 3350 17 G/17G
17 POWDER, FOR SOLUTION ORAL
Refills: 0 | Status: DISCONTINUED | OUTPATIENT
Start: 2020-11-04 | End: 2020-11-05

## 2020-11-04 RX ORDER — SODIUM CHLORIDE 9 MG/ML
1000 INJECTION INTRAMUSCULAR; INTRAVENOUS; SUBCUTANEOUS
Refills: 0 | Status: DISCONTINUED | OUTPATIENT
Start: 2020-11-04 | End: 2020-11-04

## 2020-11-04 RX ADMIN — SENNA PLUS 2 TABLET(S): 8.6 TABLET ORAL at 10:06

## 2020-11-04 RX ADMIN — GABAPENTIN 300 MILLIGRAM(S): 400 CAPSULE ORAL at 17:09

## 2020-11-04 RX ADMIN — HEPARIN SODIUM 5000 UNIT(S): 5000 INJECTION INTRAVENOUS; SUBCUTANEOUS at 22:16

## 2020-11-04 RX ADMIN — DULOXETINE HYDROCHLORIDE 60 MILLIGRAM(S): 30 CAPSULE, DELAYED RELEASE ORAL at 11:49

## 2020-11-04 RX ADMIN — Medication 5 MILLIGRAM(S): at 11:50

## 2020-11-04 RX ADMIN — HEPARIN SODIUM 5000 UNIT(S): 5000 INJECTION INTRAVENOUS; SUBCUTANEOUS at 05:16

## 2020-11-04 RX ADMIN — FINASTERIDE 5 MILLIGRAM(S): 5 TABLET, FILM COATED ORAL at 11:50

## 2020-11-04 RX ADMIN — TAMSULOSIN HYDROCHLORIDE 0.4 MILLIGRAM(S): 0.4 CAPSULE ORAL at 05:14

## 2020-11-04 RX ADMIN — GABAPENTIN 300 MILLIGRAM(S): 400 CAPSULE ORAL at 05:16

## 2020-11-04 RX ADMIN — TAMSULOSIN HYDROCHLORIDE 0.4 MILLIGRAM(S): 0.4 CAPSULE ORAL at 17:09

## 2020-11-04 NOTE — PROGRESS NOTE ADULT - ATTENDING COMMENTS
Patient was seen and examined personally by me. I have discussed the plan and reviewed the resident's note and agree with the above physical exam findings including assessment and plan except as indicated below.    Briefly, 65M hx of prostate cancer s/p radiation therapy (last 10/2020), prior indwelling catheter (removed 10/19 outpatient), depression, HTN who presents with worsening LE swelling a/w acute renal failure likely 2/2 obstructive uropathy in setting of radiation. No indication for urgent HD at this time, diuresing after Kim placed, creatinine improving with resolution of hyperkalemia. Monitor closely for post obstructive diuresis, appreciate nephrology recommendations. TTE and renal sono negative. Team reached out to urologist Dr. Iqbal and Radonc dr. Owusu, will likely need to be discharged with kim. Hold home lisinopril, pending improving of renal function.    Dispo - home pending improvement of renal function, likely with kim.

## 2020-11-04 NOTE — PHYSICAL THERAPY INITIAL EVALUATION ADULT - CRITERIA FOR SKILLED THERAPEUTIC INTERVENTIONS
rehab potential/impairments found/therapy frequency/anticipated discharge recommendation/predicted duration of therapy intervention

## 2020-11-04 NOTE — PROVIDER CONTACT NOTE (OTHER) - BACKGROUND
Patient is a 66 y/o M admitted for obstructive and reflux uropathy recently ordered for Miralax and senna.

## 2020-11-04 NOTE — PROGRESS NOTE ADULT - ASSESSMENT
HD2: 65M with PMH HTN, depression, prostate adenocarcinoma s/p radiation therapy with acute renal failure likely 2/2 obstructive uropathy in setting of prostate radiation therapy with b/l LE edema.

## 2020-11-04 NOTE — PROGRESS NOTE ADULT - SUBJECTIVE AND OBJECTIVE BOX
Patient is a 65y old Male who presents with a chief complaint of b/l LE edema (03 Nov 2020 12:25).    SUBJECTIVE / OVERNIGHT EVENTS: Patient reported overnight frequent urge to defecate without relief after two bowel movements. Patient ambulated to bathroom more than hourly overnight, not compliant and refused call bell use, given fall risk. D/c'd miralax and senna.   Patient with good UOP, denies shortness of breath, orthopnea     MEDICATIONS  (STANDING):  finasteride 5 milliGRAM(s) Oral daily  heparin   Injectable 5000 Unit(s) SubCutaneous every 8 hours  oxybutynin 5 milliGRAM(s) Oral daily  sodium chloride 0.45%. 1000 milliLiter(s) (100 mL/Hr) IV Continuous <Continuous>  tamsulosin 0.4 milliGRAM(s) Oral two times a day    MEDICATIONS  (PRN):      PHYSICAL EXAM:  ICU Vital Signs Last 24 Hrs  T(C): 36.9 (04 Nov 2020 05:15), Max: 36.9 (03 Nov 2020 17:53)  T(F): 98.4 (04 Nov 2020 05:15), Max: 98.4 (03 Nov 2020 17:53)  HR: 95 (04 Nov 2020 05:15) (95 - 115)  BP: 156/89 (04 Nov 2020 05:15) (153/95 - 167/88)  BP(mean): --  ABP: --  ABP(mean): --  RR: 17 (04 Nov 2020 05:15) (16 - 17)  SpO2: 98% (04 Nov 2020 05:15) (98% - 100%)    CONSTITUTIONAL: NAD, well-developed, well-groomed  RESPIRATORY: Normal respiratory effort; lungs are clear to auscultation bilaterally  CARDIOVASCULAR: Regular rate and rhythm, normal S1 and S2, no murmur/rub/gallop; 2+ LE edema extending to thighs  GASTROINTESTINAL: Nontender to palpation, normoactive bowel sounds, no rebound/guarding; No hepatosplenomegaly  MUSCULOSKELETAL:  no clubbing or cyanosis of digits; no joint swelling or tenderness to palpation  NEUROLOGY: non-focal; no gross sensory deficits   PSYCH: A+O to person, place, and time; affect appropriate  SKIN: No rashes; warm                CBC Full  -  ( 04 Nov 2020 06:00 )  WBC Count : 3.65 K/uL  RBC Count : 3.96 M/uL  Hemoglobin : 11.7 g/dL  Hematocrit : 37.4 %  Platelet Count - Automated : 96 K/uL  Mean Cell Volume : 94.4 fL  Mean Cell Hemoglobin : 29.5 pg  Mean Cell Hemoglobin Concentration : 31.3 %  Auto Neutrophil # : x  Auto Lymphocyte # : x  Auto Monocyte # : x  Auto Eosinophil # : x  Auto Basophil # : x  Auto Neutrophil % : x  Auto Lymphocyte % : x  Auto Monocyte % : x  Auto Eosinophil % : x  Auto Basophil % : x               11.7   3.65  )-----------( 96       ( 04 Nov 2020 06:00 )             37.4       11-04    144  |  113<H>  |  36<H>  ----------------------------<  102<H>  4.9   |  22  |  1.26    Ca    8.9      04 Nov 2020 06:00  Phos  3.0     11-04  Mg     2.0     11-04    TPro  6.9  /  Alb  3.8  /  TBili  0.3  /  DBili  x   /  AST  11  /  ALT  23  /  AlkPhos  43  11-03                   Patient is a 65y old Male who presents with a chief complaint of b/l LE edema (03 Nov 2020 12:25).    SUBJECTIVE / OVERNIGHT EVENTS: Patient reported overnight frequent urge to defecate without relief after two bowel movements. Patient ambulated to bathroom more than hourly overnight, not compliant and refused call bell use, given fall risk. D/c'd miralax and senna.   Patient with good UOP, denies shortness of breath, orthopnea .      MEDICATIONS  (STANDING):  finasteride 5 milliGRAM(s) Oral daily  heparin   Injectable 5000 Unit(s) SubCutaneous every 8 hours  oxybutynin 5 milliGRAM(s) Oral daily  sodium chloride 0.45%. 1000 milliLiter(s) (100 mL/Hr) IV Continuous <Continuous>  tamsulosin 0.4 milliGRAM(s) Oral two times a day.     MEDICATIONS  (PRN):      PHYSICAL EXAM:  ICU Vital Signs Last 24 Hrs  T(C): 36.9 (04 Nov 2020 05:15), Max: 36.9 (03 Nov 2020 17:53)  T(F): 98.4 (04 Nov 2020 05:15), Max: 98.4 (03 Nov 2020 17:53)  HR: 95 (04 Nov 2020 05:15) (95 - 115)  BP: 156/89 (04 Nov 2020 05:15) (153/95 - 167/88)  RR: 17 (04 Nov 2020 05:15) (16 - 17)  SpO2: 98% (04 Nov 2020 05:15) (98% - 100%)    CONSTITUTIONAL: NAD, well-developed, well-groomed  RESPIRATORY: Normal respiratory effort; lungs are clear to auscultation bilaterally  CARDIOVASCULAR: Regular rate and rhythm, normal S1 and S2, no murmur/rub/gallop; 2+ LE edema extending to thighs  GASTROINTESTINAL: Nontender to palpation, normoactive bowel sounds, no rebound/guarding; No hepatosplenomegaly  MUSCULOSKELETAL:  no clubbing or cyanosis of digits; no joint swelling or tenderness to palpation  NEUROLOGY: non-focal; no gross sensory deficits   PSYCH: A+O to person, place, and time; affect appropriate  SKIN: No rashes; warm                CBC Full  -  ( 04 Nov 2020 06:00 )  WBC Count : 3.65 K/uL  RBC Count : 3.96 M/uL  Hemoglobin : 11.7 g/dL  Hematocrit : 37.4 %  Platelet Count - Automated : 96 K/uL  Mean Cell Volume : 94.4 fL  Mean Cell Hemoglobin : 29.5 pg  Mean Cell Hemoglobin Concentration : 31.3 %  Auto Neutrophil # : x  Auto Lymphocyte # : x  Auto Monocyte # : x  Auto Eosinophil # : x  Auto Basophil # : x  Auto Neutrophil % : x  Auto Lymphocyte % : x  Auto Monocyte % : x  Auto Eosinophil % : x  Auto Basophil % : x               11.7   3.65  )-----------( 96       ( 04 Nov 2020 06:00 )             37.4       11-04    144  |  113<H>  |  36<H>  ----------------------------<  102<H>  4.9   |  22  |  1.26    Ca    8.9      04 Nov 2020 06:00  Phos  3.0     11-04  Mg     2.0     11-04    TPro  6.9  /  Alb  3.8  /  TBili  0.3  /  DBili  x   /  AST  11  /  ALT  23  /  AlkPhos  43  11-03

## 2020-11-04 NOTE — PROGRESS NOTE ADULT - ASSESSMENT
65M with PMH HTN, depression, prostate adenocarcinoma s/p radiation therapy on presents to [facility] ED c/o b/l LE edema. previous hx of urinary rentention had a Maldonado until 2 weeks ago. At ED found hyperkalemic (7.9) and sCr 7.5    nephrology consulted for DAYSI and hyperkalemia

## 2020-11-04 NOTE — PROVIDER CONTACT NOTE (OTHER) - SITUATION
Patient c/o frequent urge to defecate without relief after two bowel movements. Patient ambulated to bathroom more then hourly overnight without improvement.

## 2020-11-04 NOTE — PHYSICAL THERAPY INITIAL EVALUATION ADULT - ADDITIONAL COMMENTS
Pt. reports owning DME of straight cane and rolling walker.     Pt. was left semisupine in bed post PT Evaluation, no apparent distress, all lines intact, call bell within reach.

## 2020-11-04 NOTE — PROGRESS NOTE ADULT - PROBLEM SELECTOR PLAN 2
- Maldonado in place  - Monitor I/Os - Maldonado in place  - Monitor I/Os  - US kidneys - Negative hydronephrosis, findings include multiple cysts b/l kidneys

## 2020-11-04 NOTE — PROVIDER CONTACT NOTE (OTHER) - ASSESSMENT
Patient is not compliant and refusing call bell use, toileting schedule, bed alarm, and fall prevention instruction. Patient insisting on ambulating, unsteady gait noted.

## 2020-11-04 NOTE — PROVIDER CONTACT NOTE (OTHER) - ASSESSMENT
Patient is stable alert and oriented. Patient denies chest pain or shortness of breath, no acute distress noted. Will continue to monitor.

## 2020-11-04 NOTE — PHYSICAL THERAPY INITIAL EVALUATION ADULT - GENERAL OBSERVATIONS, REHAB EVAL
Consult received, chart reviewed. Patient received semisupine in bed, no apparent distress, +tele, +kim.

## 2020-11-04 NOTE — PROGRESS NOTE ADULT - PROBLEM SELECTOR PLAN 5
Bx: adenocarcinoma of the prostate with Andre score 3 + 4 =7 involving 5 % - 30% of the tissue.  - TNM Stage: c T 1c N 0 M 0   - Contacted Dr. Thong Iqbal and Dr. Max Owusu   - Dr. Owusu recs for d/c with kim given concern for atonic bladder- likely risk for re-obstruction.   - c/w flomax, finasteride, oxybutynin. Bx: adenocarcinoma of the prostate with [place] score 3 + 4 =7 involving 5 % - 30% of the tissue.  - TNM Stage: c T 1c N 0 M 0   - Contacted Dr. Thong Iqbal and Dr. Max Owusu    - Dr. Owusu recs for d/c with kim given concern for atonic bladder- likely risk for re-obstruction.   - c/w flomax, finasteride, oxybutynin.

## 2020-11-04 NOTE — PROGRESS NOTE ADULT - PROBLEM SELECTOR PLAN 1
Pt with DAYSI in the setting of obstruction, previous hx of urinary retention and kim use. [facility]3 1.1- At ED found hyperkalemic (7.9) and sCr 7.5 (11/2) - Kim cath placed and 1L of urine drained. Today sCr has markedly improved to 3.58 11/3. Agree with Kim catheter placement. Encouraged adequate po intake.  kidney function returned to baseline. recommend stop IVF and monitor urine output encourage PO intake repeat BMP 6 hours after fluids have stopped.  Monitor labs and urine output. Avoid NSAIDs, ACEI/ARBS, RCA and nephrotoxins. Dose medications as per eGFR.

## 2020-11-04 NOTE — PROGRESS NOTE ADULT - SUBJECTIVE AND OBJECTIVE BOX
[facility] Division of Kidney Diseases & Hypertension  FOLLOW UP NOTE  --------------------------------------------------------------------------------  Chief Complaint: B/L edema    24 hour events/subjective: Patient was seen and evaluated at bedside this morning.  He reports no chest pain no shortness of breath.  Lower extremity edema is improving.  Feels thirsty.      PAST HISTORY  --------------------------------------------------------------------------------  No significant changes to PMH, PSH, FHx, SHx, unless otherwise noted    ALLERGIES & MEDICATIONS  --------------------------------------------------------------------------------  Allergies    No Known Allergies    Intolerances      Standing Inpatient Medications  DULoxetine 60 milliGRAM(s) Oral daily  finasteride 5 milliGRAM(s) Oral daily  gabapentin 300 milliGRAM(s) Oral two times a day  heparin   Injectable 5000 Unit(s) SubCutaneous every 8 hours  melatonin 3 milliGRAM(s) Oral at bedtime  oxybutynin 5 milliGRAM(s) Oral daily  sodium chloride 0.45%. 1000 milliLiter(s) IV Continuous <Continuous>  tamsulosin 0.4 milliGRAM(s) Oral two times a day    PRN Inpatient Medications  polyethylene glycol 3350 17 Gram(s) Oral two times a day PRN  senna 2 Tablet(s) Oral every 12 hours PRN      REVIEW OF SYSTEMS  --------------------------------------------------------------------------------  Gen: no fever  Respiratory: no sob  CV: no cp  GI: no abdominal   : kim catheter  MSK: no pain    VITALS/PHYSICAL EXAM  --------------------------------------------------------------------------------  T(C): 36.9 (11-04-20 @ 05:15), Max: 36.9 (11-03-20 @ 17:53)  HR: 95 (11-04-20 @ 05:15) (95 - 115)  BP: 156/89 (11-04-20 @ 05:15) (153/95 - 167/88)  ABP: --  ABP(mean): --  RR: 17 (11-04-20 @ 05:15) (16 - 17)  SpO2: 98% (11-04-20 @ 05:15) (98% - 100%)  CVP(mm Hg): --  Height (cm): 185.4 (11-03-20 @ 05:00)  Weight (kg): 97.8 (11-03-20 @ 05:00)  BMI (kg/m2): 28.5 (11-03-20 @ 05:00)  BSA (m2): 2.22 (11-03-20 @ 05:00)      11-03-20 @ 07:01  -  11-04-20 @ 07:00  --------------------------------------------------------  IN: 0 mL / OUT: 6200 mL / NET: -6200 mL      Physical Exam:  	Gen: NAD, cooperative  	Pulm: CTA B/L  	CV: S1S2, RRR  	Abd: Soft, +BS, NT  	Ext: 2+ LE edema B/L  	Neuro: Awake, A&O x3, moving al extremities   	Skin: Warm and dry             : +ve kim    LABS/STUDIES  --------------------------------------------------------------------------------              11.7   3.65  >-----------<  96       [11-04-20 @ 06:00]              37.4     144  |  113  |  36  ----------------------------<  102      [11-04-20 @ 06:00]  4.9   |  22  |  1.26        Ca     8.9     [11-04-20 @ 06:00]      Mg     2.0     [11-04-20 @ 06:00]      Phos  3.0     [11-04-20 @ 06:00]    TPro  6.9  /  Alb  3.8  /  TBili  0.3  /  DBili  x   /  AST  11  /  ALT  23  /  AlkPhos  43  [11-03-20 @ 04:20]          Creatinine Trend:  SCr 1.26 [11-04 @ 06:00]  SCr 1.66 [11-03 @ 17:55]  SCr 2.21 [11-03 @ 11:48]  SCr 3.58 [11-03 @ 04:20]  SCr 4.51 [11-03 @ 01:15]    Urinalysis - [11-02-20 @ 19:05]      Color LIGHT YELLOW / Appearance CLEAR / SG 1.013 / pH 6.0      Gluc NEGATIVE / Ketone NEGATIVE  / Bili NEGATIVE / Urobili NORMAL       Blood MODERATE / Protein NEGATIVE / Leuk Est NEGATIVE / Nitrite NEGATIVE      RBC 11-25 / WBC 3-5 / Hyaline NEGATIVE / Gran  / Sq Epi OCC / Non Sq Epi  / Bacteria NEGATIVE    Urine Creatinine 42.90      [11-02-20 @ 23:20]  Urine Sodium 80      [11-02-20 @ 23:20]  Urine Potassium 20.1      [11-02-20 @ 23:20]    Iron 56, TIBC 183, %sat --      [11-03-20 @ 04:20]  Ferritin 639.1      [11-03-20 @ 04:20]

## 2020-11-04 NOTE — PROGRESS NOTE ADULT - PROBLEM SELECTOR PLAN 3
- likely due to Likely 2/2 volume overload in setting on ARF  - Duplex neg. for DVT - likely due to Likely 2/2 volume overload in setting on ARF  - Duplex neg. for DVT  - ECHO- unremarkable for cardiac findings - rec re-imaging with IV enhancing agent. - likely due to Likely 2/2 volume overload in setting on ARF  - Duplex neg. for DVT  - ECHO- unremarkable for cardiac findings - rec re-imaging with IV enhancing agent.  - PT eval

## 2020-11-04 NOTE — PROGRESS NOTE ADULT - PROBLEM SELECTOR PLAN 1
- Crt on presentation 7.56 --> 1.26 this morning.   - Hyperkalemia resolved, s/p ca gluconate, insulin and lokelma   - ARF likely 2/2 post-renal obstruction given urinary output ~7.2L  - US - negative hydronephrosis - multiple cysts b/l.   - Avoid nephrotoxic agents, monitor I/Os - Crt on presentation 7.56 --> 1.26 this morning.   - Hyperkalemia resolved, s/p ca gluconate, insulin and lokelma   - ARF likely 2/2 post-renal obstruction given urinary output   - US - negative hydronephrosis - multiple cysts b/l.   - Avoid nephrotoxic agents, monitor I/Os

## 2020-11-05 ENCOUNTER — TRANSCRIPTION ENCOUNTER (OUTPATIENT)
Age: 65
End: 2020-11-05

## 2020-11-05 VITALS
DIASTOLIC BLOOD PRESSURE: 81 MMHG | TEMPERATURE: 98 F | HEART RATE: 81 BPM | RESPIRATION RATE: 18 BRPM | OXYGEN SATURATION: 99 % | SYSTOLIC BLOOD PRESSURE: 159 MMHG

## 2020-11-05 DIAGNOSIS — N19 UNSPECIFIED KIDNEY FAILURE: ICD-10-CM

## 2020-11-05 LAB
ANION GAP SERPL CALC-SCNC: 7 MMO/L — SIGNIFICANT CHANGE UP (ref 7–14)
BUN SERPL-MCNC: 19 MG/DL — SIGNIFICANT CHANGE UP (ref 7–23)
CALCIUM SERPL-MCNC: 8.3 MG/DL — LOW (ref 8.4–10.5)
CHLORIDE SERPL-SCNC: 113 MMOL/L — HIGH (ref 98–107)
CO2 SERPL-SCNC: 22 MMOL/L — SIGNIFICANT CHANGE UP (ref 22–31)
CREAT SERPL-MCNC: 1 MG/DL — SIGNIFICANT CHANGE UP (ref 0.5–1.3)
GLUCOSE SERPL-MCNC: 96 MG/DL — SIGNIFICANT CHANGE UP (ref 70–99)
HCT VFR BLD CALC: 34.8 % — LOW (ref 39–50)
HGB BLD-MCNC: 11.3 G/DL — LOW (ref 13–17)
MAGNESIUM SERPL-MCNC: 1.8 MG/DL — SIGNIFICANT CHANGE UP (ref 1.6–2.6)
MCHC RBC-ENTMCNC: 30 PG — SIGNIFICANT CHANGE UP (ref 27–34)
MCHC RBC-ENTMCNC: 32.5 % — SIGNIFICANT CHANGE UP (ref 32–36)
MCV RBC AUTO: 92.3 FL — SIGNIFICANT CHANGE UP (ref 80–100)
NRBC # FLD: 0 K/UL — SIGNIFICANT CHANGE UP (ref 0–0)
PHOSPHATE SERPL-MCNC: 2.6 MG/DL — SIGNIFICANT CHANGE UP (ref 2.5–4.5)
PLATELET # BLD AUTO: 91 K/UL — LOW (ref 150–400)
PMV BLD: 10.1 FL — SIGNIFICANT CHANGE UP (ref 7–13)
POTASSIUM SERPL-MCNC: 3.9 MMOL/L — SIGNIFICANT CHANGE UP (ref 3.5–5.3)
POTASSIUM SERPL-SCNC: 3.9 MMOL/L — SIGNIFICANT CHANGE UP (ref 3.5–5.3)
RBC # BLD: 3.77 M/UL — LOW (ref 4.2–5.8)
RBC # FLD: 13.2 % — SIGNIFICANT CHANGE UP (ref 10.3–14.5)
SODIUM SERPL-SCNC: 142 MMOL/L — SIGNIFICANT CHANGE UP (ref 135–145)
WBC # BLD: 3.67 K/UL — LOW (ref 3.8–10.5)
WBC # FLD AUTO: 3.67 K/UL — LOW (ref 3.8–10.5)

## 2020-11-05 PROCEDURE — 99239 HOSP IP/OBS DSCHRG MGMT >30: CPT | Mod: GC

## 2020-11-05 RX ORDER — LISINOPRIL 2.5 MG/1
1 TABLET ORAL
Qty: 0 | Refills: 0 | DISCHARGE

## 2020-11-05 RX ORDER — CEPHALEXIN 500 MG
250 CAPSULE ORAL EVERY 6 HOURS
Refills: 0 | Status: DISCONTINUED | OUTPATIENT
Start: 2020-11-05 | End: 2020-11-05

## 2020-11-05 RX ORDER — IBUPROFEN 200 MG
3 TABLET ORAL
Qty: 0 | Refills: 0 | DISCHARGE

## 2020-11-05 RX ORDER — CEPHALEXIN 500 MG
500 CAPSULE ORAL
Refills: 0 | Status: DISCONTINUED | OUTPATIENT
Start: 2020-11-05 | End: 2020-11-05

## 2020-11-05 RX ORDER — CEPHALEXIN 500 MG
1 CAPSULE ORAL
Qty: 20 | Refills: 0
Start: 2020-11-05

## 2020-11-05 RX ADMIN — Medication 500 MILLIGRAM(S): at 10:00

## 2020-11-05 RX ADMIN — HEPARIN SODIUM 5000 UNIT(S): 5000 INJECTION INTRAVENOUS; SUBCUTANEOUS at 05:10

## 2020-11-05 RX ADMIN — DULOXETINE HYDROCHLORIDE 60 MILLIGRAM(S): 30 CAPSULE, DELAYED RELEASE ORAL at 13:16

## 2020-11-05 RX ADMIN — TAMSULOSIN HYDROCHLORIDE 0.4 MILLIGRAM(S): 0.4 CAPSULE ORAL at 05:10

## 2020-11-05 RX ADMIN — HEPARIN SODIUM 5000 UNIT(S): 5000 INJECTION INTRAVENOUS; SUBCUTANEOUS at 13:16

## 2020-11-05 RX ADMIN — Medication 5 MILLIGRAM(S): at 13:16

## 2020-11-05 RX ADMIN — GABAPENTIN 300 MILLIGRAM(S): 400 CAPSULE ORAL at 05:10

## 2020-11-05 RX ADMIN — FINASTERIDE 5 MILLIGRAM(S): 5 TABLET, FILM COATED ORAL at 13:16

## 2020-11-05 NOTE — DISCHARGE NOTE PROVIDER - CARE PROVIDERS DIRECT ADDRESSES
,DirectAddress_Unknown,[email or web address] ,DirectAddress_Unknown,[email or web address],DirectAddress_Unknown

## 2020-11-05 NOTE — PROGRESS NOTE ADULT - PROBLEM SELECTOR PLAN 5
Bx: adenocarcinoma of the prostate with [place] score 3 + 4 =7 involving 5 % - 30% of the tissue.  - TNM Stage: c T 1c N 0 M 0   - Contacted Dr. Thong Iqbal and Dr. Max Owusu    - Dr. Owusu recs for d/c with kim given concern for atonic bladder- likely risk for re-obstruction.   - c/w flomax, finasteride, oxybutynin.

## 2020-11-05 NOTE — DISCHARGE NOTE PROVIDER - HOSPITAL COURSE
65M with PMH HTN, depression, prostate adenocarcinoma s/p radiation therapy on presents to [facility] ED on 11/2/2020 c/o b/l LE edema. Pt was diagnosed December 2019. Patient underwent prostate biopsy with Dr. Will Muñoz on 12/03/2019, pathology report demonstrated adenocarcinoma of the prostate with Andre score 3 + 4 =7 involving 5 % - 30% of the tissue. Patient was treated with radiation tx by Dr. Max Owusu and completed tx approx. 2 weeks ago. Patient required kim catheterization 3 weeks ago due to urinary retention. Kim was removed 2 weeks ago. Pt endorsed the progression of LE edema/ It has been worsening over the past couple days and has made it difficult for the patient to ambulate. He also notes abdominal distention. He makes urine, and even endorses incontinence. Patient noted to have 3+ pitting edema b/l lower extremities up to knees. In ED, given calcium gluconate, hydralazine, humulin, lisinopril, lokelma.  ODuring his admission, a kim was placed and patient voided ~6.8L. Patient labs were significant for hyperkalemia (6.8/7.9), sCr: 7.56, BUN: 136. Initial EKG with no peaked t waves, no widened QRS, distinct P waves, normal qTc. Given ARF/Cr clearance, held cialis, cymbalta, lisinopril, oxybutynin, gabapentin. Duplex US b/l LE: negative for dVT. Ultrasound of Bladder and kidney  - negative for hydronephrosis. ECHO – negative for cardiac findings. ECG with normal sinus rhythm, no changes noted. Contacted Dr. Thong Iqbal and Dr. Max Owusu  to inform them of patient’s presentation. Recs from Dr. Owusu to have kim remain in place for d/c until outpatient follow up with concern for likely re-obstruction due to atonic bladder. During admission, patient continues to improve uneventfully. sCr trending down (1.0 from 7.56), BUN (19 from 136), Potassium (3.4 from 6.8). Patient seen by PT- patient requiring home PT for unsteady gait/weakness. Patient’s LLE was noted to be continuously erythematous, noted to be warm to touch during admission. Patient started on Keflex for concern of cellulitis. Patient’s PCP Dr. Stevan Love contacted and informed of patient’s condition. Patient will require d/c home with kim and to follow up with Dr. Thong Iqbal (urologist). Patient to f/u with Dr. Love for resolution of LE edema/cellulitis, f/u CBC,BMP in one week, and resume held Lisinopril when appropriate. Patient home care coordinated with case management for kim care and home PT. Patient determined to be hemodynamically stable for home care.    65M with PMH HTN, depression, prostate adenocarcinoma s/p radiation therapy on presents to [facility] ED on 11/2/2020 c/o b/l LE edema. Pt was diagnosed December 2019. Patient underwent prostate biopsy with Dr. Will Muñoz on 12/03/2019, pathology report demonstrated adenocarcinoma of the prostate with Andre score 3 + 4 =7 involving 5 % - 30% of the tissue. Patient was treated with radiation tx by Dr. Max Owusu and completed tx approx. 2 weeks ago. Patient required kim catheterization 3 weeks ago due to urinary retention. Kim was removed 2 weeks ago. Pt endorsed the progression of LE edema/ It has been worsening over the past couple days and has made it difficult for the patient to ambulate. He also notes abdominal distention. He makes urine, and even endorses incontinence. Patient noted to have 3+ pitting edema b/l lower extremities up to knees. In ED, given calcium gluconate, hydralazine, humulin, lisinopril, lokelma.  ODuring his admission, a kim was placed and patient voided ~6.8L. Patient labs were significant for hyperkalemia (6.8/7.9), sCr: 7.56, BUN: 136. Initial EKG with no peaked t waves, no widened QRS, distinct P waves, normal qTc. Given ARF/Cr clearance, held cialis, cymbalta, lisinopril, oxybutynin, gabapentin. Duplex US b/l LE: negative for dVT. Ultrasound of Bladder and kidney  - negative for hydronephrosis. ECHO – negative for cardiac findings. ECG with normal sinus rhythm, no changes noted. Contacted Dr. Thong Iqbal and Dr. Max Owusu  to inform them of patient’s presentation. Recs from Dr. Owusu to have kim remain in place for d/c until outpatient follow up with concern for likely re-obstruction due to atonic bladder. During admission, patient continues to improve uneventfully. sCr trending down (1.0 from 7.56), BUN (19 from 136), Potassium (3.4 from 6.8). Patient seen by PT- patient requiring home PT for unsteady gait/weakness. Patient’s LLE was noted to be continuously erythematous, noted to be warm to touch during admission. Patient started on Keflex for concern of cellulitis. Patient’s PCP Dr. Stevan Love contacted and informed of patient’s condition. Patient will require d/c home with kim and to follow up with Dr. Thong Iqbal (urologist). Patient to f/u with Dr. Love for resolution of LE edema/cellulitis, f/u CBC,BMP in one week, and resume held Lisinopril when appropriate. Patient home care coordinated with case management for kim care and home PT. Patient remains hemodynamically stable and is now medically clear for discharge home.    65M with PMH HTN, depression, prostate adenocarcinoma s/p radiation therapy on presents to [facility] ED on 11/2/2020 c/o b/l LE edema. Pt was diagnosed December 2019. Patient underwent prostate biopsy with Dr. Will Muñoz on 12/03/2019, pathology report demonstrated adenocarcinoma of the prostate with Andre score 3 + 4 =7 involving 5 % - 30% of the tissue. Patient was treated with radiation tx by Dr. Max Owusu and completed tx approx. 2 weeks ago. Patient required kim catheterization 3 weeks ago due to urinary retention. Kim was removed 2 weeks ago. Pt endorsed the progression of LE edema/ It has been worsening over the past couple days and has made it difficult for the patient to ambulate. He also notes abdominal distention. He makes urine, and even endorses incontinence. Patient noted to have 3+ pitting edema b/l lower extremities up to knees. In ED, given calcium gluconate, hydralazine, humulin, lisinopril, lokelma.  ODuring his admission, a kim was placed and patient voided ~6.8L. Patient labs were significant for hyperkalemia (6.8/7.9), sCr: 7.56, BUN: 136. Initial EKG with no peaked t waves, no widened QRS, distinct P waves, normal qTc. Given ARF/Cr clearance, held cialis, cymbalta, lisinopril, oxybutynin, gabapentin. Duplex US b/l LE: negative for dVT. Ultrasound of Bladder and kidney - negative for hydronephrosis. ECHO – negative for cardiac findings. ECG with normal sinus rhythm, no changes noted. Contacted Dr. Thong Iqbal and Dr. Max Owusu  to inform them of patient’s presentation. Recs from Dr. Owusu to have kim remain in place for d/c until outpatient follow up with concern for likely re-obstruction due to atonic bladder. During admission, patient continues to improve uneventfully. sCr trending down (1.0 from 7.56), BUN (19 from 136), Potassium (3.4 from 6.8). Patient seen by PT- patient requiring home PT for unsteady gait/weakness. Patient’s LLE was noted to have worsening erythema, so he was started on Keflex for concern of cellulitis. Patient’s PCP Dr. Stevan Love contacted and informed of patient’s condition. Patient will require d/c home with kim and to follow up with Dr. Thong Iqbal (urologist) within week of discharge. Patient to f/u with Dr. Love for resolution of LE edema/cellulitis, f/u CBC,BMP in one week, and resume held Lisinopril when appropriate however it is still being held at time of discharge. Patient home care coordinated with case management for kim care and home PT. Patient remains hemodynamically stable and is now medically clear for discharge home.

## 2020-11-05 NOTE — PROGRESS NOTE ADULT - PROBLEM SELECTOR PLAN 2
- Maldonado in place  - Monitor I/Os  - US kidneys - Negative hydronephrosis, findings include multiple cysts b/l kidneys

## 2020-11-05 NOTE — DISCHARGE NOTE PROVIDER - CARE PROVIDER_API CALL
RONAL SUAREZ  Internal Medicine  [address]  [place] [address]  Phone: [phone]  Fax: [phone]  Established Patient  Follow Up Time: 1 week    Thong Iqbal  UROLOGY  [address]  [place] [address]  Phone: [phone]  Fax: [phone]  Established Patient  Follow Up Time: 1 week    Max Owusu  INTERNAL MEDICINE  [address] [place] [address], [facility] [place] [address]  Phone: [phone]  Fax: [phone]  Established Patient  Follow Up Time:    RONAL SUAREZ  Internal Medicine  [address]  [place] [address]  Phone: [phone]  Fax: [phone]  Established Patient  Follow Up Time: 1 week    Thong Iqbal  UROLOGY  [address] [facility]  [place] [address]  Phone: [phone]  Fax: [phone]  Established Patient  Follow Up Time: 1 week    Max Owusu  INTERNAL MEDICINE  [address] [facility]  [place] [address]  Phone: [phone]  Fax: [phone]  Established Patient  Follow Up Time:     Homer Chris  INTERNAL MEDICINE  [address] [place] [address]  Phone: [phone]  Fax: [phone]  Follow Up Time: 1 week

## 2020-11-05 NOTE — PROGRESS NOTE ADULT - ATTENDING COMMENTS
Patient was seen and examined personally by me. I have discussed the plan and reviewed the resident's note and agree with the above physical exam findings including assessment and plan except as indicated below.    Briefly, 65M hx of prostate cancer s/p radiation therapy (last 10/2020), prior indwelling catheter (removed 10/19 outpatient), depression, HTN who presents with worsening LE swelling a/w acute renal failure likely 2/2 obstructive uropathy in setting of radiation. No indication for urgent HD, diuresed after Kim placed, DAYSI and hyperkalemia resolved. Monitor closely for post obstructive diuresis, appreciate nephrology recommendations. TTE and renal sono negative. Team reached out to urologist Dr. Iqbal and Radonc dr. Owusu, plan to dc with kim. LLE non-purulent cellulitis, SIRS negative, ambulating without pain, keflex 500 qid x 5 days (verified dosing for renal clearance). BP stable off home lisinopril initially held for DAYSI.     Patient is stable for discharge today with follow up with urology, rad/onc, nephrology for repeat labs to assess renal function and TOV as indicated. He will follow up with PCP for BP check, and discuss resuming lisinopril if needed, and monitoring of his LLE cellulitis. Total time spent on dc ~37 minutes. Patient was seen and examined personally by me. I have discussed the plan and reviewed the resident's note and agree with the above physical exam findings including assessment and plan except as indicated below.    Briefly, 65M hx of prostate cancer s/p radiation therapy (last 10/2020), prior indwelling catheter (removed 10/19 outpatient), depression, HTN who presents with worsening LE swelling a/w acute renal failure likely 2/2 obstructive uropathy in setting of radiation. No indication for urgent HD, diuresed after Kim placed, DAYSI and hyperkalemia resolved. Monitor closely for post obstructive diuresis, appreciate nephrology recommendations. TTE with preserved LV fxn, LE duplex neg for DVT, renal sono negative. Team reached out to urologist Dr. Iqbal and Radonc dr. Owusu, plan to dc with kim. LLE non-purulent cellulitis, SIRS negative, ambulating without pain, keflex 500 qid x 5 days (verified dosing for renal clearance). BP stable off home lisinopril initially held for DAYSI.     Patient is stable for discharge today with follow up with urology, rad/onc, nephrology for repeat labs to assess renal function and TOV as indicated. He will follow up with PCP for BP check, and discuss resuming lisinopril if needed, and monitoring of his LLE cellulitis. Total time spent on dc ~37 minutes.

## 2020-11-05 NOTE — PROGRESS NOTE ADULT - SUBJECTIVE AND OBJECTIVE BOX
Patient is a 65y old Male who presents with a chief complaint of b/l LE edema (03 Nov 2020 12:25).    SUBJECTIVE / OVERNIGHT EVENTS: Pt noted to be voiding pink tinged urine. Patient otherwise asymptomatic. Patient with good UOP, denies shortness of breath, orthopnea.     MEDICATIONS  (STANDING):  DULoxetine 60 milliGRAM(s) Oral daily  finasteride 5 milliGRAM(s) Oral daily  gabapentin 300 milliGRAM(s) Oral two times a day  heparin   Injectable 5000 Unit(s) SubCutaneous every 8 hours  melatonin 3 milliGRAM(s) Oral at bedtime  oxybutynin 5 milliGRAM(s) Oral daily  tamsulosin 0.4 milliGRAM(s) Oral two times a day    MEDICATIONS  (PRN):  polyethylene glycol 3350 17 Gram(s) Oral two times a day PRN Constipation  senna 2 Tablet(s) Oral every 12 hours PRN Constipation        PHYSICAL EXAM:  ICU Vital Signs Last 24 Hrs  T(C): 36.8 (05 Nov 2020 04:00), Max: 36.8 (04 Nov 2020 11:45)  T(F): 98.2 (05 Nov 2020 04:00), Max: 98.3 (04 Nov 2020 11:45)  HR: 85 (05 Nov 2020 04:00) (80 - 87)  BP: 154/80 (05 Nov 2020 04:00) (147/80 - 157/87)  RR: 18 (05 Nov 2020 04:00) (16 - 18)  SpO2: 100% (05 Nov 2020 04:00) (100% - 100%)      CONSTITUTIONAL: NAD, well-developed, well-groomed  RESPIRATORY: Normal respiratory effort; lungs are clear to auscultation bilaterally  CARDIOVASCULAR: Regular rate and rhythm, normal S1 and S2, no murmur/rub/gallop; 2+ LE edema extending to thighs  GASTROINTESTINAL: Nontender to palpation, normoactive bowel sounds, no rebound/guarding; No hepatosplenomegaly  MUSCULOSKELETAL:  no clubbing or cyanosis of digits; no joint swelling or tenderness to palpation, 2+ LE edema extending to thighs, mild erythema at left calf  NEUROLOGY: non-focal; no gross sensory deficits   PSYCH: A+O to person, place, and time; affect appropriate  SKIN: No rashes; warm       CBC Full  -  ( 05 Nov 2020 06:30 )  WBC Count : 3.67 K/uL  RBC Count : 3.77 M/uL  Hemoglobin : 11.3 g/dL  Hematocrit : 34.8 %  Platelet Count - Automated : 91 K/uL  Mean Cell Volume : 92.3 fL  Mean Cell Hemoglobin : 30.0 pg  Mean Cell Hemoglobin Concentration : 32.5 %  Auto Neutrophil # : x  Auto Lymphocyte # : x  Auto Monocyte # : x  Auto Eosinophil # : x  Auto Basophil # : x  Auto Neutrophil % : x  Auto Lymphocyte % : x  Auto Monocyte % : x  Auto Eosinophil % : x  Auto Basophil % : x                          11.3   3.67  )-----------( 91       ( 05 Nov 2020 06:30 )             34.8       11-05    142  |  113<H>  |  19  ----------------------------<  96  3.9   |  22  |  1.00    Ca    8.3<L>      05 Nov 2020 06:30  Phos  2.6     11-05  Mg     1.8     11-05                  CAPILLARY BLOOD GLUCOSE      POCT Blood Glucose.: 212 mg/dL (03 Nov 2020 08:49)                     Patient is a 65y old Male who presents with a chief complaint of b/l LE edema (03 Nov 2020 12:25).    SUBJECTIVE / OVERNIGHT EVENTS: Pt noted to be voiding pink tinged urine. Patient otherwise asymptomatic. Patient with good UOP, denies shortness of breath, orthopnea.     MEDICATIONS  (STANDING):  DULoxetine 60 milliGRAM(s) Oral daily  finasteride 5 milliGRAM(s) Oral daily  gabapentin 300 milliGRAM(s) Oral two times a day  heparin   Injectable 5000 Unit(s) SubCutaneous every 8 hours  melatonin 3 milliGRAM(s) Oral at bedtime  oxybutynin 5 milliGRAM(s) Oral daily  tamsulosin 0.4 milliGRAM(s) Oral two times a day    MEDICATIONS  (PRN):  polyethylene glycol 3350 17 Gram(s) Oral two times a day PRN Constipation  senna 2 Tablet(s) Oral every 12 hours PRN Constipation        PHYSICAL EXAM:  ICU Vital Signs Last 24 Hrs  T(C): 36.8 (05 Nov 2020 04:00), Max: 36.8 (04 Nov 2020 11:45)  T(F): 98.2 (05 Nov 2020 04:00), Max: 98.3 (04 Nov 2020 11:45)  HR: 85 (05 Nov 2020 04:00) (80 - 87)  BP: 154/80 (05 Nov 2020 04:00) (147/80 - 157/87)  RR: 18 (05 Nov 2020 04:00) (16 - 18)  SpO2: 100% (05 Nov 2020 04:00) (100% - 100%)      I&O's Detail    04 Nov 2020 07:01  -  05 Nov 2020 07:00  --------------------------------------------------------  IN:  Total IN: 0 mL    OUT:    Indwelling Catheter - Urethral (mL): 2005 mL    Voided (mL): 600 mL  Total OUT: 2605 mL    Total NET: -2605 mL        CONSTITUTIONAL: NAD, well-developed, well-groomed  RESPIRATORY: Normal respiratory effort; lungs are clear to auscultation bilaterally  CARDIOVASCULAR: Regular rate and rhythm, normal S1 and S2, no murmur/rub/gallop; 2+ LE edema extending to thighs  GASTROINTESTINAL: Nontender to palpation, normoactive bowel sounds, no rebound/guarding; No hepatosplenomegaly  MUSCULOSKELETAL:  no clubbing or cyanosis of digits; no joint swelling or tenderness to palpation, 2+ LE edema extending to thighs, mild erythema at left calf  NEUROLOGY: non-focal; no gross sensory deficits   PSYCH: A+O to person, place, and time; affect appropriate  SKIN: No rashes; warm       CBC Full  -  ( 05 Nov 2020 06:30 )  WBC Count : 3.67 K/uL  RBC Count : 3.77 M/uL  Hemoglobin : 11.3 g/dL  Hematocrit : 34.8 %  Platelet Count - Automated : 91 K/uL  Mean Cell Volume : 92.3 fL  Mean Cell Hemoglobin : 30.0 pg  Mean Cell Hemoglobin Concentration : 32.5 %  Auto Neutrophil # : x  Auto Lymphocyte # : x  Auto Monocyte # : x  Auto Eosinophil # : x  Auto Basophil # : x  Auto Neutrophil % : x  Auto Lymphocyte % : x  Auto Monocyte % : x  Auto Eosinophil % : x  Auto Basophil % : x                          11.3   3.67  )-----------( 91       ( 05 Nov 2020 06:30 )             34.8       11-05    142  |  113<H>  |  19  ----------------------------<  96  3.9   |  22  |  1.00    Ca    8.3<L>      05 Nov 2020 06:30  Phos  2.6     11-05  Mg     1.8     11-05                  CAPILLARY BLOOD GLUCOSE  POCT Blood Glucose.: 212 mg/dL (03 Nov 2020 08:49)                      Patient is a 65y old Male who presents with a chief complaint of b/l LE edema (03 Nov 2020 12:25).    SUBJECTIVE / OVERNIGHT EVENTS: Pt noted to be voiding pink tinged urine. Patient otherwise asymptomatic. Patient with good UOP, denies shortness of breath, orthopnea. Left LE erythema and warmth noted - concerning for cellulitis - started pt on Keflex.     MEDICATIONS  (STANDING):  DULoxetine 60 milliGRAM(s) Oral daily  finasteride 5 milliGRAM(s) Oral daily  gabapentin 300 milliGRAM(s) Oral two times a day  heparin   Injectable 5000 Unit(s) SubCutaneous every 8 hours  melatonin 3 milliGRAM(s) Oral at bedtime  oxybutynin 5 milliGRAM(s) Oral daily  tamsulosin 0.4 milliGRAM(s) Oral two times a day    MEDICATIONS  (PRN):  polyethylene glycol 3350 17 Gram(s) Oral two times a day PRN Constipation  senna 2 Tablet(s) Oral every 12 hours PRN Constipation        PHYSICAL EXAM:  ICU Vital Signs Last 24 Hrs  T(C): 36.8 (05 Nov 2020 04:00), Max: 36.8 (04 Nov 2020 11:45)  T(F): 98.2 (05 Nov 2020 04:00), Max: 98.3 (04 Nov 2020 11:45)  HR: 85 (05 Nov 2020 04:00) (80 - 87)  BP: 154/80 (05 Nov 2020 04:00) (147/80 - 157/87)  RR: 18 (05 Nov 2020 04:00) (16 - 18)  SpO2: 100% (05 Nov 2020 04:00) (100% - 100%)      I&O's Detail    04 Nov 2020 07:01  -  05 Nov 2020 07:00  --------------------------------------------------------  IN:  Total IN: 0 mL    OUT:    Indwelling Catheter - Urethral (mL): 2005 mL    Voided (mL): 600 mL  Total OUT: 2605 mL    Total NET: -2605 mL        CONSTITUTIONAL: NAD, well-developed, well-groomed  RESPIRATORY: Normal respiratory effort; lungs are clear to auscultation bilaterally  CARDIOVASCULAR: Regular rate and rhythm, normal S1 and S2, no murmur/rub/gallop; 2+ LE edema extending to thighs  GASTROINTESTINAL: Nontender to palpation, normoactive bowel sounds, no rebound/guarding; No hepatosplenomegaly  MUSCULOSKELETAL:  no clubbing or cyanosis of digits; no joint swelling or tenderness to palpation, 2+ LE edema extending to thighs, erythema/warmth at left calf  NEUROLOGY: non-focal; no gross sensory deficits   PSYCH: A+O to person, place, and time; affect appropriate  SKIN: No rashes; warm       CBC Full  -  ( 05 Nov 2020 06:30 )  WBC Count : 3.67 K/uL  RBC Count : 3.77 M/uL  Hemoglobin : 11.3 g/dL  Hematocrit : 34.8 %  Platelet Count - Automated : 91 K/uL  Mean Cell Volume : 92.3 fL  Mean Cell Hemoglobin : 30.0 pg  Mean Cell Hemoglobin Concentration : 32.5 %  Auto Neutrophil # : x  Auto Lymphocyte # : x  Auto Monocyte # : x  Auto Eosinophil # : x  Auto Basophil # : x  Auto Neutrophil % : x  Auto Lymphocyte % : x  Auto Monocyte % : x  Auto Eosinophil % : x  Auto Basophil % : x                          11.3   3.67  )-----------( 91       ( 05 Nov 2020 06:30 )             34.8       11-05    142  |  113<H>  |  19  ----------------------------<  96  3.9   |  22  |  1.00    Ca    8.3<L>      05 Nov 2020 06:30  Phos  2.6     11-05  Mg     1.8     11-05                  CAPILLARY BLOOD GLUCOSE  POCT Blood Glucose.: 212 mg/dL (03 Nov 2020 08:49)

## 2020-11-05 NOTE — DISCHARGE NOTE PROVIDER - NSDCCPCAREPLAN_GEN_ALL_CORE_FT
PRINCIPAL DISCHARGE DIAGNOSIS  Diagnosis: Acute urinary obstruction  Assessment and Plan of Treatment: You presented to the emergency department with urinary obstruction and      SECONDARY DISCHARGE DIAGNOSES  Diagnosis: Hyperkalemia  Assessment and Plan of Treatment: Hyperkalemia    Diagnosis: Leg swelling  Assessment and Plan of Treatment:     Diagnosis: Renal failure  Assessment and Plan of Treatment:      PRINCIPAL DISCHARGE DIAGNOSIS  Diagnosis: Acute urinary obstruction  Assessment and Plan of Treatment: You presented to the emergency department with lower extremity swelling. You were found to have kidney injury which improved after you had a kim placed. You subsequently drained a significant amount of urine through the kim, so we believe you had a urinary obstruction due to your enlarged prostate. You will be discharged with a kim to prevent recurrence of obstruction and kidney injury. Please follow up with your urologist within a week of discharge to determine if you continue to require the kim.      SECONDARY DISCHARGE DIAGNOSES  Diagnosis: Leg swelling  Assessment and Plan of Treatment: You came to the hospital with leg swelling which may have been caused by urinary obstruction. We also ruled out other causes for leg swelling. You had a lower extremity doppler which did not show any clots. Your transthoracic echocardiogram was normal. Your liver function was normal as well. Please follow up with your primary doctow within a week of discharge.    Diagnosis: Cellulitis  Assessment and Plan of Treatment: You had worsening redness on your left calf. We started you on the antibiotic Keflex for cellulitis. Please continue to take it 4 times a day for a total of 5 days.    Diagnosis: Hyperkalemia  Assessment and Plan of Treatment: You were found to have high potassium levels on admission. These eventually normalized once your kidney function returned to normal. Please follow up with your primary doctor after discharge in order to check you electrolytes and kidney function.     PRINCIPAL DISCHARGE DIAGNOSIS  Diagnosis: Acute urinary obstruction  Assessment and Plan of Treatment: You presented to the emergency department with lower extremity swelling. You were found to have kidney injury which improved after you had a kim placed. You subsequently drained a significant amount of urine through the kim, so we believe you had a urinary obstruction due to your enlarged prostate. You will be discharged with a kim to prevent recurrence of obstruction and kidney injury. Please follow up with your urologist within a week of discharge to determine if you continue to require the kim. Also, please follow up with nephrology (the kidney specialist - Dr. Chris ) after discharge to monitor your kidney function. Please avoid medications that can harm the kidney such as Advil (NSAIDs). We are also holding your BP medications lisinopril. You can follow up with your PCP after discharge regarding restarting this medication.      SECONDARY DISCHARGE DIAGNOSES  Diagnosis: Leg swelling  Assessment and Plan of Treatment: You came to the hospital with leg swelling which may have been caused by urinary obstruction. We also ruled out other causes for leg swelling. You had a lower extremity doppler which did not show any clots. Your transthoracic echocardiogram was normal. Your liver function was normal as well. Please follow up with your primary doctow within a week of discharge.    Diagnosis: Cellulitis  Assessment and Plan of Treatment: You had worsening redness on your left calf. We started you on the antibiotic Keflex for cellulitis. Please continue to take it 4 times a day for a total of 5 days.    Diagnosis: Hyperkalemia  Assessment and Plan of Treatment: You were found to have high potassium levels on admission. These eventually normalized once your kidney function returned to normal. Please follow up with your primary doctor after discharge in order to check you electrolytes and kidney function.

## 2020-11-05 NOTE — PROGRESS NOTE ADULT - PROBLEM SELECTOR PROBLEM 1
Acute renal failure, unspecified acute renal failure type
Acute renal failure, unspecified acute renal failure type
DAYSI (acute kidney injury)
Acute renal failure, unspecified acute renal failure type

## 2020-11-05 NOTE — CHART NOTE - NSCHARTNOTEFT_GEN_A_CORE
Patient has been off intravenous fluids with improving fluid balance creatinine has returned to baseline.  Will sign off patient should follow up with me  upon discharge.

## 2020-11-05 NOTE — DISCHARGE NOTE PROVIDER - PROVIDER TOKENS
PROVIDER:[TOKEN:[[id number]],FOLLOWUP:[1 week],ESTABLISHEDPATIENT:[T]],PROVIDER:[TOKEN:[[id number]],FOLLOWUP:[1 week],ESTABLISHEDPATIENT:[T]],PROVIDER:[TOKEN:[[id number]],ESTABLISHEDPATIENT:[T]] PROVIDER:[TOKEN:[[id number]],FOLLOWUP:[1 week],ESTABLISHEDPATIENT:[T]],PROVIDER:[TOKEN:[[id number]],FOLLOWUP:[1 week],ESTABLISHEDPATIENT:[T]],PROVIDER:[[id number]],ESTABLISHEDPATIENT:[T]],PROVIDER:[TOKEN:[[id number]],FOLLOWUP:[1 week]]

## 2020-11-05 NOTE — DISCHARGE NOTE PROVIDER - NSDCMRMEDTOKEN_GEN_ALL_CORE_FT
Advil 200 mg oral tablet: 3 tab(s) orally 2 times a day  Cialis 5 mg oral tablet: 1 tab(s) orally once a day, As Needed  Cymbalta 60 mg oral delayed release capsule: 1 cap(s) orally once a day  finasteride 5 mg oral tablet: 1 tab(s) orally once a day  Flomax 0.4 mg oral capsule: 1 cap(s) orally 2 times a day  gabapentin 300 mg oral tablet: 300 milligram(s) orally 2 times a day  lisinopril 20 mg oral tablet: 1 tab(s) orally once a day  oxybutynin 5 mg oral tablet: 1 tab(s) orally once a day  Physical Therapy: ICD10: R26.81  Attending: Yudi Hong MD  1 each  once    Advil 200 mg oral tablet: 3 tab(s) orally 2 times a day  Cialis 5 mg oral tablet: 1 tab(s) orally once a day, As Needed  Cymbalta 60 mg oral delayed release capsule: 1 cap(s) orally once a day  finasteride 5 mg oral tablet: 1 tab(s) orally once a day  Flomax 0.4 mg oral capsule: 1 cap(s) orally 2 times a day  gabapentin 300 mg oral tablet: 300 milligram(s) orally 2 times a day  Keflex 500 mg oral capsule: take 1 capsule by mouth every 6 hours for 5 days.   oxybutynin 5 mg oral tablet: 1 tab(s) orally once a day  Physical Therapy: ICD10: R26.81  Attending: Yudi Hong MD  1 each  once    Cialis 5 mg oral tablet: 1 tab(s) orally once a day, As Needed  Cymbalta 60 mg oral delayed release capsule: 1 cap(s) orally once a day  finasteride 5 mg oral tablet: 1 tab(s) orally once a day  Flomax 0.4 mg oral capsule: 1 cap(s) orally 2 times a day  gabapentin 300 mg oral tablet: 300 milligram(s) orally 2 times a day  Keflex 500 mg oral capsule: take 1 capsule by mouth every 6 hours for 5 days.   oxybutynin 5 mg oral tablet: 1 tab(s) orally once a day  Physical Therapy: ICD10: R26.81  Attending: Yudi Hong MD  1 each  once

## 2020-11-05 NOTE — PROGRESS NOTE ADULT - PROBLEM SELECTOR PLAN 1
- Crt on presentation 7.56 --> 1.26 this morning.   - Hyperkalemia resolved, s/p ca gluconate, insulin and lokelma   - ARF likely 2/2 post-renal obstruction given urinary output   - US - negative hydronephrosis - multiple cysts b/l.   - Avoid nephrotoxic agents, monitor I/Os - Crt on presentation 7.56 --> 1.26 this morning.   - Hyperkalemia resolved, s/p ca gluconate, insulin and lokelma   - ARF likely 2/2 post-renal obstruction given urinary output   - US - negative hydronephrosis - multiple cysts b/l.   - Avoid nephrotoxic agents, monitor I/Os.

## 2020-11-05 NOTE — PROGRESS NOTE ADULT - PROBLEM SELECTOR PLAN 4
- hold home lisinopril given elevated ARF.

## 2020-11-05 NOTE — DISCHARGE NOTE PROVIDER - NSDCFUADDAPPT_GEN_ALL_CORE_FT
Patient to f/u with Dr. Love for evaluation/resolution of lower leg edema/cellulitis, evaluation of BP and resumption lisinopril, repeat CBC/BMP.     Patient to f/u with Dr. Iqbal for determination of when to d/c kim and evaluation of urinary retention.

## 2020-11-05 NOTE — PROGRESS NOTE ADULT - PROBLEM SELECTOR PLAN 3
- likely due to Likely 2/2 volume overload in setting on ARF  - Duplex neg. for DVT  - ECHO- unremarkable for cardiac findings - rec re-imaging with IV enhancing agent.  - PT eval - likely due to Likely 2/2 volume overload in setting on ARF  - Duplex neg. for DVT  - Left LE erythema and warmth noted - concerning for cellulitis - started pt on Keflex.   - ECHO- unremarkable for cardiac findings - rec re-imaging with IV enhancing agent.  - PT eval- rec home PT

## 2020-11-05 NOTE — PROGRESS NOTE ADULT - PROBLEM SELECTOR PLAN 6
Plan: - due to Cr clearance, held cialis, lisinopril, gabapentin.  - Resumed cymbalta last night.
Plan: - due to Cr clearance, held cialis, lisinopril, gabapentin.  - Resumed cymbalta last night.
Plan: - due to Cr clearance, held cialis, cymbalta, lisinopril, gabapentin.

## 2020-11-05 NOTE — DISCHARGE NOTE NURSING/CASE MANAGEMENT/SOCIAL WORK - PATIENT PORTAL LINK FT
You can access the FollowMyHealth Patient Portal offered by [facility] by registering at the following website: [email or web address]. By joining [facility]’s FollowMyHealth portal, you will also be able to view your health information using other applications (apps) compatible with our system.

## 2020-11-06 ENCOUNTER — NON-APPOINTMENT (OUTPATIENT)
Age: 65
End: 2020-11-06

## 2020-11-10 LAB — PSA SERPL-MCNC: 3.99 NG/ML

## 2020-11-11 PROBLEM — I10 ESSENTIAL (PRIMARY) HYPERTENSION: Chronic | Status: ACTIVE | Noted: 2020-11-03

## 2020-11-11 RX ORDER — ENEMA 19; 7 G/133ML; G/133ML
7-19 ENEMA RECTAL
Qty: 2 | Refills: 0 | Status: COMPLETED | COMMUNITY
Start: 2019-09-24 | End: 2020-11-11

## 2020-11-11 RX ORDER — DIAZEPAM 5 MG/1
5 TABLET ORAL
Qty: 1 | Refills: 0 | Status: COMPLETED | COMMUNITY
Start: 2020-08-04 | End: 2020-11-11

## 2020-11-11 NOTE — PHYSICAL THERAPY INITIAL EVALUATION ADULT - SIT-TO-STAND BALANCE
pt placed on hospital bed, pt resting comfortably, all monitors in place good minus/with rolling walker

## 2020-11-12 ENCOUNTER — APPOINTMENT (OUTPATIENT)
Dept: UROLOGY | Facility: CLINIC | Age: 65
End: 2020-11-12
Payer: COMMERCIAL

## 2020-11-12 VITALS — TEMPERATURE: 97.5 F

## 2020-11-12 PROCEDURE — 99215 OFFICE O/P EST HI 40 MIN: CPT

## 2020-11-12 RX ORDER — DEXAMETHASONE 4 MG/1
4 TABLET ORAL
Qty: 30 | Refills: 0 | Status: COMPLETED | COMMUNITY
Start: 2020-10-07 | End: 2020-11-12

## 2020-11-12 RX ORDER — LISINOPRIL 20 MG/1
20 TABLET ORAL
Refills: 0 | Status: COMPLETED | COMMUNITY
End: 2020-11-12

## 2020-11-12 RX ORDER — OXYBUTYNIN CHLORIDE 5 MG/1
5 TABLET ORAL
Qty: 60 | Refills: 1 | Status: COMPLETED | COMMUNITY
Start: 2020-10-03 | End: 2020-11-12

## 2020-11-12 RX ORDER — TAMSULOSIN HYDROCHLORIDE 0.4 MG/1
0.4 CAPSULE ORAL
Qty: 60 | Refills: 5 | Status: COMPLETED | COMMUNITY
Start: 2020-10-07 | End: 2020-11-12

## 2020-11-12 RX ORDER — TADALAFIL 5 MG/1
5 TABLET ORAL
Qty: 30 | Refills: 0 | Status: COMPLETED | COMMUNITY
Start: 2020-10-20 | End: 2020-11-12

## 2020-11-12 RX ORDER — METHYLPREDNISOLONE 4 MG/1
4 TABLET ORAL
Qty: 1 | Refills: 0 | Status: COMPLETED | COMMUNITY
Start: 2020-10-05 | End: 2020-11-12

## 2020-11-12 NOTE — REVIEW OF SYSTEMS
[Heart Rate Is Fast] : fast heart rate [Abdominal Pain] : abdominal pain [Constipation] : constipation [Diarrhea] : diarrhea [Urine retention] : urine retention [Wake up at night to urinate  How many times?  ___] : wakes up to urinate [unfilled] times during the night [Bladder fullness after urinating] : bladder fullness after urinating [Joint Swelling] : joint swelling [Dizziness] : dizziness [Difficulty Walking] : difficulty walking [Anxiety] : anxiety [Negative] : Heme/Lymph

## 2020-11-17 ENCOUNTER — APPOINTMENT (OUTPATIENT)
Dept: UROLOGY | Facility: CLINIC | Age: 65
End: 2020-11-17
Payer: COMMERCIAL

## 2020-11-17 ENCOUNTER — NON-APPOINTMENT (OUTPATIENT)
Age: 65
End: 2020-11-17

## 2020-11-17 VITALS — DIASTOLIC BLOOD PRESSURE: 91 MMHG | HEART RATE: 99 BPM | SYSTOLIC BLOOD PRESSURE: 165 MMHG

## 2020-11-17 PROCEDURE — 99214 OFFICE O/P EST MOD 30 MIN: CPT

## 2020-11-17 NOTE — PHYSICAL EXAM
[General Appearance - Well Developed] : well developed [General Appearance - Well Nourished] : well nourished [Normal Appearance] : normal appearance [Well Groomed] : well groomed [General Appearance - In No Acute Distress] : no acute distress [Abdomen Soft] : soft [Abdomen Tenderness] : non-tender [Costovertebral Angle Tenderness] : no ~M costovertebral angle tenderness [Urethral Meatus] : meatus normal [Penis Abnormality] : normal circumcised penis [Testes Tenderness] : no tenderness of the testes [Testes Mass (___cm)] : there were no testicular masses

## 2020-11-17 NOTE — ASSESSMENT
[FreeTextEntry1] : Bladder was filled, catheter removed.  Patient was taught clean urine catheterization.  He was able to perform it in the office today without difficulty.\par \par He will continue on finasteride 5 mg once daily.  Follow-up in 4 weeks in the office.\par All questions answered.

## 2020-11-17 NOTE — ASSESSMENT
[FreeTextEntry1] : Reviewed the outside records as well as his previous prostate cancer treatment records.\par I suspect that his urinary retention secondary to prostate inflammation related to his recent SBRT treatment.\par I recommended that he return to the office for cath removal and void trial with CIC teaching.\par \par Natural history of urinary retention as well as potential etiology including outlet obstruction secondary to enlarged prostate as well as detrusor hypocontractility secondary to over distention of the bladder\par \par I am optimistic that eventually his urinary function should improve and he will not require long-term catheterization.

## 2020-11-17 NOTE — HISTORY OF PRESENT ILLNESS
[FreeTextEntry1] : Here for cath removal, void trial and CIC teaching.\par Urine remains clear.  No fever/chills, nausea/vomiting, abdominal pain, flank pain.

## 2020-11-17 NOTE — LETTER BODY
[Dear  ___] : Dear  [unfilled], [FreeTextEntry1] : I had the pleasure of seeing your patient, LASHAE BOSWELL, in the office today.  \par \par Please see my office note below.\par \par Thank you for allowing me to participate in his care and please do not hesitate to contact me with any questions or concerns regarding his care.\par \par Sincerely,\par \par Rui Choi MD\par Chief of Urology, [facility]\par  of Urology\par [address] [place] [address], [place] [address]\[place] [address]\par PH:      [phone]\par Email:  [email or web address]

## 2020-11-17 NOTE — HISTORY OF PRESENT ILLNESS
[FreeTextEntry1] : He was initially noted to have elevated PSA of 4.72 ng/mL.  This is increased from previous value of 4.07 ng/mL.  He underwent a prostate biopsy which revealed Bowlus 7 (3+4) prostate cancer.  Was counseled regarding treatment options and eventually underwent SBRT.\par \par He subsequently developed urinary retention and incomplete bladder emptying.  He had several trials of cath removal and void trial however he failed and most recently was in the emergency room over the weekend, underwent Maldonado catheter placement for urinary retention with approximately 600 mL of retained urine.\par \par Prior to his radiation treatments he had mild to moderate urinary symptoms but never had previous urinary retention.  She has been treated with steroids and currently remains on finasteride 5 mg once daily as well as tamsulosin 0.4 mg.

## 2020-12-09 LAB — PSA SERPL-MCNC: 0.62 NG/ML

## 2020-12-17 ENCOUNTER — RX RENEWAL (OUTPATIENT)
Age: 65
End: 2020-12-17

## 2020-12-22 ENCOUNTER — RX RENEWAL (OUTPATIENT)
Age: 65
End: 2020-12-22

## 2021-01-11 ENCOUNTER — APPOINTMENT (OUTPATIENT)
Dept: UROLOGY | Facility: CLINIC | Age: 66
End: 2021-01-11

## 2021-02-22 ENCOUNTER — APPOINTMENT (OUTPATIENT)
Dept: UROLOGY | Facility: CLINIC | Age: 66
End: 2021-02-22
Payer: COMMERCIAL

## 2021-02-22 VITALS — SYSTOLIC BLOOD PRESSURE: 147 MMHG | HEART RATE: 74 BPM | DIASTOLIC BLOOD PRESSURE: 77 MMHG

## 2021-02-22 DIAGNOSIS — N39.41 URGE INCONTINENCE: ICD-10-CM

## 2021-02-22 PROCEDURE — 99214 OFFICE O/P EST MOD 30 MIN: CPT

## 2021-02-22 PROCEDURE — 99072 ADDL SUPL MATRL&STAF TM PHE: CPT

## 2021-02-22 RX ORDER — CEPHALEXIN 500 MG/1
500 CAPSULE ORAL
Qty: 20 | Refills: 0 | Status: COMPLETED | COMMUNITY
Start: 2020-11-05

## 2021-02-22 RX ORDER — VALACYCLOVIR 1 G/1
1 TABLET, FILM COATED ORAL
Qty: 20 | Refills: 0 | Status: COMPLETED | COMMUNITY
Start: 2020-09-03

## 2021-03-09 NOTE — ASSESSMENT
[FreeTextEntry1] : CaP: continue q3-6 months PSA\par \par Urinary symptoms: Recommend to continue tamsulosin, finasteride.  Recommend to taper off clean remittent catheterization schedule.  We will perform it once daily and keep track of post void residuals.  If residual remains low, then will discontinue intermittent catheterization.\par \par Recommend to start oxybutynin ER 5 mg once daily for urge incontinence symptoms.\par Goals of medication reviewed.  Discussed the potential adverse effects of the medication.  Discussed the proper administration of the medication.\par \par Follow up in three months.

## 2021-03-09 NOTE — HISTORY OF PRESENT ILLNESS
[FreeTextEntry1] : Here for 3-month visit.  History of prostate cancer status post SBRT.  Developed urinary retention that was refractory to medication.  Following catheter removal he was started on clean intermittent catheterization.  He has been on finasteride and tamsulosin.  And over the last 3 months his urinary function is significantly improved.  He is now catheterizing about twice a day and he is voiding in between.  Currently when he catheterizes, his post void residual is approximately 100 mL.\par \par Primary issue now is developed intermittent urinary frequency and urgency with occasional urge incontinence in between catheterizations.\par \par He denies gross hematuria, dysuria.  No fever/chills nausea/vomiting.  No abdominal pain or flank pain.\par \par Prostate cancer: PSA results reviewed, significant decline to 0.62 ng/mL 12/8/2020.

## 2021-03-17 ENCOUNTER — RX RENEWAL (OUTPATIENT)
Age: 66
End: 2021-03-17

## 2021-04-02 LAB
ANION GAP SERPL CALC-SCNC: 11 MMOL/L
BASOPHILS # BLD AUTO: 0.03 K/UL
BASOPHILS NFR BLD AUTO: 0.8 %
BUN SERPL-MCNC: 14 MG/DL
CALCIUM SERPL-MCNC: 9.6 MG/DL
CHLORIDE SERPL-SCNC: 103 MMOL/L
CO2 SERPL-SCNC: 28 MMOL/L
CREAT SERPL-MCNC: 0.96 MG/DL
EOSINOPHIL # BLD AUTO: 0.06 K/UL
EOSINOPHIL NFR BLD AUTO: 1.7 %
GLUCOSE SERPL-MCNC: 89 MG/DL
HCT VFR BLD CALC: 46.3 %
HGB BLD-MCNC: 15.4 G/DL
IMM GRANULOCYTES NFR BLD AUTO: 0.3 %
LYMPHOCYTES # BLD AUTO: 0.94 K/UL
LYMPHOCYTES NFR BLD AUTO: 26.1 %
MAN DIFF?: NORMAL
MCHC RBC-ENTMCNC: 29.7 PG
MCHC RBC-ENTMCNC: 33.3 GM/DL
MCV RBC AUTO: 89.4 FL
MONOCYTES # BLD AUTO: 0.33 K/UL
MONOCYTES NFR BLD AUTO: 9.2 %
NEUTROPHILS # BLD AUTO: 2.23 K/UL
NEUTROPHILS NFR BLD AUTO: 61.9 %
PLATELET # BLD AUTO: 163 K/UL
POTASSIUM SERPL-SCNC: 4.8 MMOL/L
RBC # BLD: 5.18 M/UL
RBC # FLD: 12.5 %
SODIUM SERPL-SCNC: 142 MMOL/L
WBC # FLD AUTO: 3.6 K/UL

## 2021-04-21 LAB — PSA SERPL-MCNC: 0.33 NG/ML

## 2021-04-22 ENCOUNTER — APPOINTMENT (OUTPATIENT)
Dept: RADIATION ONCOLOGY | Facility: CLINIC | Age: 66
End: 2021-04-22
Payer: MEDICARE

## 2021-04-22 PROCEDURE — 99024 POSTOP FOLLOW-UP VISIT: CPT

## 2021-04-22 RX ORDER — LISINOPRIL 20 MG/1
20 TABLET ORAL
Refills: 0 | Status: ACTIVE | COMMUNITY

## 2021-04-22 RX ORDER — DULOXETINE HYDROCHLORIDE 60 MG/1
60 CAPSULE, DELAYED RELEASE ORAL
Refills: 0 | Status: ACTIVE | COMMUNITY

## 2021-04-22 NOTE — HISTORY OF PRESENT ILLNESS
[FreeTextEntry1] : Mr. King is a 76 year old male who presents with adenocarcinoma of the prostate Andre score He was referred by Dr. Iqbal who patient consulted for elevated PSA of 4.72 in Sept, 2019. He underwent stereotactic radiation treatment to a dose of 4250 Gy from 9/30/20 - 10/16/20. He developed urinary retention during treatment and a Maldonado catheter was placed. He was started on Flomax and Finasteride.  Patient was referred to urologist, where he had several trials of void and catheter removal, however, he failed. He now self cauterized twice daily.\par \par He presents today for follow up. He denies burning or pain with cauterization. He follow up with Steph Harvey on 2/22/21. \par \par

## 2021-04-22 NOTE — REVIEW OF SYSTEMS
[Negative] : Genitourinary [Hematuria: Grade 0] : Hematuria: Grade 0 [Urinary Incontinence: Grade 0] : Urinary Incontinence: Grade 0  [Urinary Retention: Grade 0] : Urinary Retention: Grade 0 [Urinary Tract Pain: Grade 0] : Urinary Tract Pain: Grade 0 [Urinary Urgency: Grade 0] : Urinary Urgency: Grade 0 [Urinary Frequency: Grade 0] : Urinary Frequency: Grade 0

## 2021-08-25 ENCOUNTER — NON-APPOINTMENT (OUTPATIENT)
Age: 66
End: 2021-08-25

## 2021-08-26 ENCOUNTER — APPOINTMENT (OUTPATIENT)
Dept: UROLOGY | Facility: CLINIC | Age: 66
End: 2021-08-26
Payer: COMMERCIAL

## 2021-08-26 VITALS
TEMPERATURE: 98 F | SYSTOLIC BLOOD PRESSURE: 121 MMHG | HEIGHT: 73 IN | DIASTOLIC BLOOD PRESSURE: 78 MMHG | WEIGHT: 171 LBS | HEART RATE: 80 BPM | BODY MASS INDEX: 22.66 KG/M2

## 2021-08-26 PROCEDURE — 99213 OFFICE O/P EST LOW 20 MIN: CPT

## 2021-08-26 RX ORDER — OXYBUTYNIN CHLORIDE 5 MG/1
5 TABLET, EXTENDED RELEASE ORAL DAILY
Qty: 90 | Refills: 3 | Status: COMPLETED | COMMUNITY
Start: 2021-02-22 | End: 2021-08-26

## 2021-08-26 RX ORDER — GABAPENTIN 300 MG/1
300 CAPSULE ORAL
Refills: 0 | Status: COMPLETED | COMMUNITY
End: 2021-08-26

## 2021-08-26 RX ORDER — FINASTERIDE 5 MG/1
5 TABLET, FILM COATED ORAL
Qty: 30 | Refills: 2 | Status: COMPLETED | COMMUNITY
Start: 2020-10-07 | End: 2021-08-26

## 2021-08-26 RX ORDER — CHLORHEXIDINE/GLYCERIN/HE-CELL
JELLY (GRAM) TOPICAL
Qty: 3 | Refills: 11 | Status: ACTIVE | OUTPATIENT
Start: 2020-11-17

## 2021-09-02 LAB — PSA SERPL-MCNC: 0.42 NG/ML

## 2021-09-02 NOTE — ASSESSMENT
[FreeTextEntry1] : Prostate cancer: Repeat PSA today.  Will call patient with results.\par Incomplete bladder emptying, patient will attempt to discontinue intermittent catheterization and monitor urinary symptoms.  If he feels that he is voiding more frequently or having feeling of incomplete bladder pain, he will resume intermittent catheterization as needed.\par \par Recommend follow-up in 6 months.

## 2021-09-02 NOTE — HISTORY OF PRESENT ILLNESS
[FreeTextEntry1] : Returns for follow-up.  History of prostate cancer status post SBRT.  Following treatment he developed significant out obstruction requiring intermittent catheterizations.  He is down to catheterizing approximately once or twice a day.  His catheterized volumes have been pretty low.  At times in the morning when he is catheterized he has a postvoid residual less than 100 cc.  Daytime urination is improved significantly.  He remains on tamsulosin 0.4 mg 2 capsules at bedtime.  He denies gross hematuria, dysuria or other signs symptoms of urinary tract infection.\par \par He denies abdominal pain or flank pain.  His last PSA was 0.33 ng/mL in April 2021.

## 2022-03-03 LAB — PSA SERPL-MCNC: 0.2 NG/ML

## 2022-03-08 ENCOUNTER — APPOINTMENT (OUTPATIENT)
Dept: UROLOGY | Facility: CLINIC | Age: 67
End: 2022-03-08
Payer: MEDICARE

## 2022-03-08 PROCEDURE — 99214 OFFICE O/P EST MOD 30 MIN: CPT

## 2022-03-08 NOTE — PHYSICAL EXAM
[General Appearance - Well Developed] : well developed [General Appearance - Well Nourished] : well nourished [Normal Appearance] : normal appearance [Well Groomed] : well groomed [General Appearance - In No Acute Distress] : no acute distress [Abdomen Soft] : soft [Abdomen Tenderness] : non-tender [Costovertebral Angle Tenderness] : no ~M costovertebral angle tenderness [Urethral Meatus] : meatus normal [Urinary Bladder Findings] : the bladder was normal on palpation [Scrotum] : the scrotum was normal [Testes Mass (___cm)] : there were no testicular masses [Edema] : no peripheral edema [] : no respiratory distress [Respiration, Rhythm And Depth] : normal respiratory rhythm and effort [Exaggerated Use Of Accessory Muscles For Inspiration] : no accessory muscle use [Oriented To Time, Place, And Person] : oriented to person, place, and time [Affect] : the affect was normal [Mood] : the mood was normal [Not Anxious] : not anxious

## 2022-03-14 NOTE — ASSESSMENT
[FreeTextEntry1] : Discussion today regarding on going management of CIC.\par Currently catheterize volumes usually 100-150 mL.  \par Explained to the patient that he can discontinue CIC and monitor urinary function.\par Continue on tamsulosin 2 capsules at bedtime.\par All questions answered.

## 2022-03-14 NOTE — HISTORY OF PRESENT ILLNESS
[FreeTextEntry1] : Patient is a 66 year old man presents today \par \par On tamsulosin 0.4 mg x 2 tablets  daily. He states he is still performing CIC 1-2 times a day. \par Has some urinary frequency, denies urgency\par Nocturia x 1, he usually does CIC overnight, reports a volume of 125 -150 ml \par Denies any gross hematuria, denies dysuria \par \par PSA February 24th 2022- <0.02 ng/mL

## 2022-03-17 NOTE — DISCHARGE NOTE NURSING/CASE MANAGEMENT/SOCIAL WORK - FLU SEASON?
/84 (BP Location: Left arm, Patient Position: Lying)   Pulse 102   Temp 97.8 °F (36.6 °C) (Oral)   Resp 12   Wt 88.4 kg (194 lb 12.8 oz)   SpO2 99%   BMI 27.95 kg/m²     H&P reviewed. The patient was examined and there are no changes to the H&P.         Yes...

## 2022-09-19 LAB — PSA SERPL-MCNC: 0.36 NG/ML

## 2022-09-20 DIAGNOSIS — Z87.898 PERSONAL HISTORY OF OTHER SPECIFIED CONDITIONS: ICD-10-CM

## 2022-10-05 ENCOUNTER — RX RENEWAL (OUTPATIENT)
Age: 67
End: 2022-10-05

## 2023-01-15 ENCOUNTER — NON-APPOINTMENT (OUTPATIENT)
Age: 68
End: 2023-01-15

## 2023-01-17 ENCOUNTER — NON-APPOINTMENT (OUTPATIENT)
Age: 68
End: 2023-01-17

## 2023-02-02 DIAGNOSIS — R31.29 OTHER MICROSCOPIC HEMATURIA: ICD-10-CM

## 2023-04-05 PROCEDURE — 88112 CYTOPATH CELL ENHANCE TECH: CPT | Mod: 26

## 2023-04-16 LAB
APPEARANCE: CLEAR
BACTERIA: NEGATIVE
BILIRUBIN URINE: NEGATIVE
BLOOD URINE: NEGATIVE
COLOR: NORMAL
GLUCOSE QUALITATIVE U: NEGATIVE
HYALINE CASTS: 0 /LPF
KETONES URINE: NEGATIVE
LEUKOCYTE ESTERASE URINE: NEGATIVE
MICROSCOPIC-UA: NORMAL
NITRITE URINE: NEGATIVE
PH URINE: 7.5
PROTEIN URINE: NEGATIVE
PSA SERPL-MCNC: 0.29 NG/ML
RED BLOOD CELLS URINE: 0 /HPF
SPECIFIC GRAVITY URINE: 1.01
SQUAMOUS EPITHELIAL CELLS: 0 /HPF
URINE CYTOLOGY: NORMAL
UROBILINOGEN URINE: NORMAL
WHITE BLOOD CELLS URINE: 0 /HPF

## 2023-04-20 ENCOUNTER — APPOINTMENT (OUTPATIENT)
Dept: UROLOGY | Facility: CLINIC | Age: 68
End: 2023-04-20
Payer: MEDICARE

## 2023-04-20 VITALS
HEART RATE: 69 BPM | SYSTOLIC BLOOD PRESSURE: 147 MMHG | RESPIRATION RATE: 17 BRPM | HEIGHT: 73 IN | WEIGHT: 180 LBS | DIASTOLIC BLOOD PRESSURE: 78 MMHG | BODY MASS INDEX: 23.86 KG/M2 | TEMPERATURE: 98 F

## 2023-04-20 DIAGNOSIS — Z87.898 PERSONAL HISTORY OF OTHER SPECIFIED CONDITIONS: ICD-10-CM

## 2023-04-20 PROCEDURE — 99213 OFFICE O/P EST LOW 20 MIN: CPT

## 2023-04-20 RX ORDER — PHENAZOPYRIDINE 200 MG/1
200 TABLET, FILM COATED ORAL EVERY 8 HOURS
Qty: 9 | Refills: 0 | Status: COMPLETED | COMMUNITY
Start: 2023-01-17 | End: 2023-04-20

## 2023-04-23 LAB
APPEARANCE: CLEAR
BACTERIA UR CULT: NORMAL
BACTERIA: NEGATIVE
BILIRUBIN URINE: NEGATIVE
BLOOD URINE: NEGATIVE
COLOR: YELLOW
GLUCOSE QUALITATIVE U: NEGATIVE
HYALINE CASTS: 0 /LPF
KETONES URINE: NEGATIVE
LEUKOCYTE ESTERASE URINE: NEGATIVE
MICROSCOPIC-UA: NORMAL
NITRITE URINE: NEGATIVE
PH URINE: 6.5
PROTEIN URINE: NORMAL
RED BLOOD CELLS URINE: 3 /HPF
SPECIFIC GRAVITY URINE: 1.03
SQUAMOUS EPITHELIAL CELLS: 0 /HPF
UROBILINOGEN URINE: NORMAL
WHITE BLOOD CELLS URINE: 0 /HPF

## 2023-04-23 NOTE — ASSESSMENT
[FreeTextEntry1] : Recent PSA results reviewed.\par Episode of hematuria may be secondary to radiation cystitis/urinary tract infection.  Repeat urinalysis and culture sent today.  Urine cytology on 4/5/2023: No evidence of malignant cells.\par \par No other intervention required at this time.  Follow-up in 6 months.

## 2023-04-23 NOTE — HISTORY OF PRESENT ILLNESS
[FreeTextEntry1] : History of prostate cancer status post SBRT.  Following treatment he developed significant out obstruction requiring intermittent catheterizations.  He remains on tamsulosin 0.4 mg 2 capsules at bedtime.  \par \par He called last week with complaints of gross hematuria.  He was seen in urgent care and started on antibiotics.  Symptoms have since resolved.  He has not required Self-catheterization in several months.  His urinary function appears back to baseline.\par \par He denies abdominal pain or flank pain.  His last PSA was 0.29 ng/mL in April 2023.

## 2023-05-17 NOTE — ED ADULT TRIAGE NOTE - BP NONINVASIVE DIASTOLIC (MM HG)
Nicotinamide Supplementation Recommendations: Take 500 mg of nicotinamide by mouth twice daily. Sunscreen Recommendations: Regularly apply at least an SPF 30 broad spectrum sunscreen while outdoors during the day. Detail Level: Detailed Detail Level: Zone Patient Specific Counseling (Will Not Stick From Patient To Patient): Patient declined treatment Patient Specific Counseling (Will Not Stick From Patient To Patient): Continue using otc shampoo with zinc pyrithione daily 80

## 2023-08-03 NOTE — ED PROVIDER NOTE - ATTENDING CONTRIBUTION TO CARE
DR. PULIDO, ATTENDING MD-  I performed a face to face bedside interview with the patient regarding history of present illness, review of symptoms and past medical history. I completed an independent physical exam.  I have discussed the patient's plan of care with the resident.   Documentation as above in the note.    64 y/o male h/o prost ca here with b/l le edema and urinary retention.  Evaluate for dvt, kidney injury, lyte abn, new chf.  Obtain cbc cmp trop bnp ekg venous duplex ua ucx, place kim, will need admission for further care and evaluation.
home O2 ; COPD exacerbation teaching

## 2023-10-16 RX ORDER — TAMSULOSIN HYDROCHLORIDE 0.4 MG/1
0.4 CAPSULE ORAL
Qty: 180 | Refills: 3 | Status: ACTIVE | COMMUNITY
Start: 2020-10-02 | End: 1900-01-01

## 2023-10-17 DIAGNOSIS — R30.0 DYSURIA: ICD-10-CM

## 2023-10-17 DIAGNOSIS — C61 MALIGNANT NEOPLASM OF PROSTATE: ICD-10-CM

## 2023-10-20 LAB
APPEARANCE: CLEAR
BACTERIA: NEGATIVE /HPF
BILIRUBIN URINE: NEGATIVE
BLOOD URINE: NEGATIVE
CAST: 0 /LPF
COLOR: YELLOW
EPITHELIAL CELLS: 0 /HPF
GLUCOSE QUALITATIVE U: NEGATIVE MG/DL
KETONES URINE: NEGATIVE MG/DL
LEUKOCYTE ESTERASE URINE: NEGATIVE
MICROSCOPIC-UA: NORMAL
NITRITE URINE: NEGATIVE
PH URINE: 6.5
PROTEIN URINE: NEGATIVE MG/DL
PSA SERPL-MCNC: 0.18 NG/ML
RED BLOOD CELLS URINE: 0 /HPF
SPECIFIC GRAVITY URINE: 1.02
UROBILINOGEN URINE: 1 MG/DL
WHITE BLOOD CELLS URINE: 0 /HPF

## 2023-10-23 LAB — BACTERIA UR CULT: NORMAL

## 2023-10-26 ENCOUNTER — APPOINTMENT (OUTPATIENT)
Dept: UROLOGY | Facility: CLINIC | Age: 68
End: 2023-10-26

## 2023-11-10 NOTE — PROVIDER CONTACT NOTE (OTHER) - ACTION/TREATMENT ORDERED:
"PRIMARY DIAGNOSIS: \"GENERIC\" NURSING  OUTPATIENT/OBSERVATION GOALS TO BE MET BEFORE DISCHARGE:  ADLs back to baseline: No    Activity and level of assistance: Up with standby assistance.    Pain status: Improved-controlled with oral pain medications.    Return to near baseline physical activity: Yes     Discharge Planner Nurse   Safe discharge environment identified: No  Barriers to discharge: Yes       Entered by: Ulises Hoyt RN 11/09/2023 10:31 PM     Please review provider order for any additional goals.   Nurse to notify provider when observation goals have been met and patient is ready for discharge.Goal Outcome Evaluation:                        " Provider d/c'd miralax and senna. Ordered melatonin for tomorrow. Will continue to reinforce fall prevention measures. Patient agreeable but noncompliant despite encouragement.

## 2024-04-08 ENCOUNTER — NON-APPOINTMENT (OUTPATIENT)
Age: 69
End: 2024-04-08

## 2024-05-07 LAB — PSA SERPL-MCNC: 0.34 NG/ML

## 2024-06-21 ENCOUNTER — NON-APPOINTMENT (OUTPATIENT)
Age: 69
End: 2024-06-21

## 2024-07-16 ENCOUNTER — APPOINTMENT (OUTPATIENT)
Dept: ORTHOPEDIC SURGERY | Facility: CLINIC | Age: 69
End: 2024-07-16
Payer: MEDICARE

## 2024-07-16 VITALS — HEIGHT: 73 IN | BODY MASS INDEX: 23.86 KG/M2 | WEIGHT: 180 LBS

## 2024-07-16 DIAGNOSIS — S40.011A CONTUSION OF RIGHT SHOULDER, INITIAL ENCOUNTER: ICD-10-CM

## 2024-07-16 DIAGNOSIS — S80.01XA CONTUSION OF RIGHT KNEE, INITIAL ENCOUNTER: ICD-10-CM

## 2024-07-16 PROCEDURE — 73030 X-RAY EXAM OF SHOULDER: CPT | Mod: RT

## 2024-07-16 PROCEDURE — 73564 X-RAY EXAM KNEE 4 OR MORE: CPT | Mod: RT

## 2024-07-16 PROCEDURE — 99204 OFFICE O/P NEW MOD 45 MIN: CPT

## 2024-07-16 RX ORDER — CELECOXIB 200 MG/1
200 CAPSULE ORAL
Qty: 30 | Refills: 0 | Status: ACTIVE | COMMUNITY
Start: 2024-07-16 | End: 2024-08-15

## 2024-10-09 ENCOUNTER — APPOINTMENT (OUTPATIENT)
Dept: ORTHOPEDIC SURGERY | Facility: CLINIC | Age: 69
End: 2024-10-09
Payer: MEDICARE

## 2024-10-09 VITALS — BODY MASS INDEX: 23.86 KG/M2 | HEIGHT: 73 IN | WEIGHT: 180 LBS

## 2024-10-09 DIAGNOSIS — S80.01XA CONTUSION OF RIGHT KNEE, INITIAL ENCOUNTER: ICD-10-CM

## 2024-10-09 DIAGNOSIS — S40.011A CONTUSION OF RIGHT SHOULDER, INITIAL ENCOUNTER: ICD-10-CM

## 2024-10-09 PROCEDURE — 99213 OFFICE O/P EST LOW 20 MIN: CPT | Mod: 25

## 2024-10-09 PROCEDURE — 20611 DRAIN/INJ JOINT/BURSA W/US: CPT | Mod: RT

## 2024-10-09 PROCEDURE — J3490M: CUSTOM | Mod: NC

## 2024-10-21 ENCOUNTER — RX RENEWAL (OUTPATIENT)
Age: 69
End: 2024-10-21

## 2024-11-10 LAB — PSA SERPL-MCNC: 0.15 NG/ML

## 2024-11-19 ENCOUNTER — APPOINTMENT (OUTPATIENT)
Dept: UROLOGY | Facility: CLINIC | Age: 69
End: 2024-11-19

## 2024-11-19 VITALS
TEMPERATURE: 98 F | DIASTOLIC BLOOD PRESSURE: 80 MMHG | RESPIRATION RATE: 18 BRPM | SYSTOLIC BLOOD PRESSURE: 143 MMHG | HEART RATE: 88 BPM | OXYGEN SATURATION: 98 %

## 2024-11-19 VITALS — RESPIRATION RATE: 18 BRPM | HEART RATE: 85 BPM | SYSTOLIC BLOOD PRESSURE: 164 MMHG | DIASTOLIC BLOOD PRESSURE: 82 MMHG

## 2024-11-19 DIAGNOSIS — C61 MALIGNANT NEOPLASM OF PROSTATE: ICD-10-CM

## 2024-11-19 PROCEDURE — G2211 COMPLEX E/M VISIT ADD ON: CPT

## 2024-11-19 PROCEDURE — 99213 OFFICE O/P EST LOW 20 MIN: CPT

## 2024-12-18 ENCOUNTER — APPOINTMENT (OUTPATIENT)
Dept: ORTHOPEDIC SURGERY | Facility: CLINIC | Age: 69
End: 2024-12-18
Payer: MEDICARE

## 2024-12-18 VITALS — HEIGHT: 73 IN | WEIGHT: 180 LBS | BODY MASS INDEX: 23.86 KG/M2

## 2024-12-18 DIAGNOSIS — S40.011A CONTUSION OF RIGHT SHOULDER, INITIAL ENCOUNTER: ICD-10-CM

## 2024-12-18 PROCEDURE — 99213 OFFICE O/P EST LOW 20 MIN: CPT

## 2025-04-03 ENCOUNTER — APPOINTMENT (OUTPATIENT)
Dept: ORTHOPEDIC SURGERY | Facility: CLINIC | Age: 70
End: 2025-04-03
Payer: MEDICARE

## 2025-04-03 DIAGNOSIS — M25.811 OTHER SPECIFIED JOINT DISORDERS, RIGHT SHOULDER: ICD-10-CM

## 2025-04-03 PROCEDURE — 99204 OFFICE O/P NEW MOD 45 MIN: CPT | Mod: 25

## 2025-04-03 PROCEDURE — 20611 DRAIN/INJ JOINT/BURSA W/US: CPT | Mod: RT

## 2025-04-03 PROCEDURE — J3490M: CUSTOM | Mod: NC

## 2025-04-03 PROCEDURE — 99214 OFFICE O/P EST MOD 30 MIN: CPT | Mod: 25
